# Patient Record
Sex: FEMALE | Race: WHITE | Employment: FULL TIME | URBAN - METROPOLITAN AREA
[De-identification: names, ages, dates, MRNs, and addresses within clinical notes are randomized per-mention and may not be internally consistent; named-entity substitution may affect disease eponyms.]

---

## 2018-09-14 ENCOUNTER — OFFICE VISIT (OUTPATIENT)
Dept: DERMATOLOGY | Facility: CLINIC | Age: 38
End: 2018-09-14
Payer: COMMERCIAL

## 2018-09-14 VITALS — SYSTOLIC BLOOD PRESSURE: 119 MMHG | HEART RATE: 87 BPM | DIASTOLIC BLOOD PRESSURE: 75 MMHG

## 2018-09-14 DIAGNOSIS — L82.1 SEBORRHEIC KERATOSIS: Primary | ICD-10-CM

## 2018-09-14 DIAGNOSIS — L60.3 ONYCHORRHEXIS: ICD-10-CM

## 2018-09-14 DIAGNOSIS — D22.5 MELANOCYTIC NEVUS OF TRUNK: ICD-10-CM

## 2018-09-14 DIAGNOSIS — L81.4 SOLAR LENTIGO: ICD-10-CM

## 2018-09-14 ASSESSMENT — PAIN SCALES - GENERAL: PAINLEVEL: NO PAIN (0)

## 2018-09-14 NOTE — PATIENT INSTRUCTIONS
Dr. Gilda Hernandez - cosmetic dermatologist   - discuss treatment of solar lentigines on the face   - discuss removal of benign-appearing moles on the back   - discuss removal of non-inflamed seborrheic keratoses on the face    Hair/skin/nails multivitamin for nail changes  Can use over the counter nail lacquer to strengthen nails

## 2018-09-14 NOTE — LETTER
9/14/2018       RE: Marium Moss  [address] [address]     Dear Colleague,    Thank you for referring your patient, Marium Moss, to the [facility] DERMATOLOGY at [facility]. Please see a copy of my visit note below.    [facility] Dermatology Note      Dermatology Problem List:  1. Solar lentigines, seborrheic keratoses on face  2. Banal-appearing melanocytic nevi on the trunk    CC:   Chief Complaint   Patient presents with     Derm Problem     Marium is here today for a total body skin exam, patient has concern for spots on her face and brittle nails.          Encounter Date: Sep 14, 2018    History of Present Illness:  Ms. Marium Moss is a 38 year old female who presents for evaluation of several skin lesions of concern.     Two moles on back   - longstanding   - one raised - sometimes itchy, bleeds with trauma   - not otherwise symptomatic    Face - several dark brown spots on the face   - upper lip, sides of face, cheeks   - getting bigger and darker   - previously evaluated and diagnosed as solar lentigines   - mother and grandmother have similar-appearing spots    Nails - brittle, splitting, no pain or other symptoms   - gradual onset over last several years      Past Medical History:   Patient Active Problem List   Diagnosis     CARDIOVASCULAR SCREENING; LDL GOAL LESS THAN 160     Bipolar disorder (manic depression) (H)     Past Medical History:   Diagnosis Date     Bipolar affective disorder (H)      Depressive disorder, not elsewhere classified     sees therapist - Mary Ceja for med mgmt     Skin disease      Past Surgical History:   Procedure Laterality Date     NO HISTORY OF SURGERY         Social History:   reports that she has never smoked. She has never used smokeless tobacco. She reports that she drinks alcohol. She reports that she uses illicit drugs, including Marijuana.   Alcohol - 1-2 drinks per week    Family  History:  Family History   Problem Relation Age of Onset     Diabetes Maternal Grandmother      Hypertension Maternal Grandmother      Cerebrovascular Disease Maternal Grandmother      Thyroid Disease Maternal Grandmother      Skin Cancer Maternal Grandmother      Depression Mother      Lipids Mother      Skin Cancer Mother      Depression       self     GASTROINTESTINAL DISEASE Father      ulcer     GASTROINTESTINAL DISEASE       self   Mother did not have skin cancer - had actinic keratosis  Grandmother had NMSC    Medications:  Current Outpatient Prescriptions   Medication Sig Dispense Refill     CLONAZEPAM PO        drospirenone-ethinyl estradiol (ALBERTO) 3-0.03 MG per tablet Take 1 tablet by mouth At Bedtime 30 tablet      Allergies   Allergen Reactions     Penicillin [Penicillins] Rash         Review of Systems:  -Skin Establ Pt: The patient denies any new rash, pruritus, or lesions that are symptomatic, changing or bleeding, except as per HPI.  -Constitutional: The patient denies fatigue, fevers, chills, unintended weight loss, and night sweats.  -HEENT: Patient denies nonhealing oral sores.  -Skin: As above in HPI. No additional skin concerns.    Physical exam:  Vitals: /75 (BP Location: Left arm, Patient Position: Sitting, Cuff Size: Adult Regular)  Pulse 87  GEN: This is a well developed, well-nourished female in no acute distress, in a pleasant mood.    SKIN: Total skin excluding the undergarment areas was performed. The exam included the head/face, neck, both arms, chest, back, abdomen, both legs, digits and/or nails.   -Several thin stuck-on appearing brown papules on the central face  -Numerous tan evenly pigmented macules on the face, trunk, and extremities  -Two brown evenly pigmented papules on the central back  -Onychorrhexis of the thumbs > fifth fingernails. No subungual hyperkeratosis  -No other lesions of concern on areas examined.     Impression/Plan:  1. Facial seborrheic keratoses,  solar lentigines, and ephelides    Reassurance provided for these benign, asymptomatic lesions not treated today. If cosmetic treatment is desired, recommend follow up with one of our cosmetic dermatologists.    2. Onychorrhexis    Discussed that this is a normal, age-related finding that does not require further treatment. It can be associated with frequent soap/water exposure as well. There are certain types of rashes that can cause onychorrhexis which are absent in this patient. Can trial hair/skin/nails vitamin supplementation if desired, but discussed that there is not significant evidence to support use.    CC Dr. Gilda Hernandez on close of this encounter.  Follow-up prn       Staff Involved:  Staff Only    Edvin Garcia MD   of Dermatology  Department of Dermatology  [facility]

## 2018-09-14 NOTE — MR AVS SNAPSHOT
After Visit Summary   2018    Marium Moss    MRN: [id number]           Patient Information     Date Of Birth          1980        Visit Information        Provider Department      2018 12:15 PM Edvin Garcia MD [facility] D[facility]at[facility]        Today's Diagnoses     Seborrheic keratosis    -  1    Solar lentigo        Melanocytic nevus of trunk        Onychorrhexis          Care Instructions    Dr. Gilda Hernandez - cosmetic dermatologist   - discuss treatment of solar lentigines on the face   - discuss removal of benign-appearing moles on the back   - discuss removal of non-inflamed seborrheic keratoses on the face    Hair/skin/nails multivitamin for nail changes  Can use over the counter nail lacquer to strengthen nails          Follow-ups after your visit        Your next 10 appointments already scheduled     Oct 24, 2018  9:30 AM CDT   (Arrive by 9:15 AM)   Return Visit with MD MARY Charles [facility] Dermatology ([facility])    [address] [place] [address] [phone]              Who to contact     Please call your clinic at [phone] to:    Ask questions about your health    Make or cancel appointments    Discuss your medicines    Learn about your test results    Speak to your doctor            Additional Information About Your Visit        MyChart Information     [employer] is an electronic gateway that provides easy, online access to your medical records. With [employer], you can request a clinic appointment, read your test results, renew a prescription or communicate with your care team.     To sign up for [employer] visit the website at [email or web address]   You will be asked to enter the access code listed below, as well as some personal information. Please follow the directions to create your username and password.     Your access code is: [email or web address]  Expires: 2018  6:30 AM     Your access code will  in 90  days. If you need help or a new code, please contact your [facility] or call [phone] for assistance.        Care EveryWhere ID     This is your Care EveryWhere ID. This could be used by other organizations to access your [facility] medical records  [id number]        Your Vitals Were     Pulse                   87            Blood Pressure from Last 3 Encounters:   09/14/18 119/75   09/03/13 121/76   09/02/13 114/68    Weight from Last 3 Encounters:   09/02/13 52.2 kg (115 lb)   09/02/13 52.2 kg (115 lb)   12/28/12 53.1 kg (117 lb)              Today, you had the following     No orders found for display       Primary Care Provider Office Phone # Fax #    [facility] [phone] [address] [facility] [address]        Equal Access to Services     HONORIO BYRNE : Hadii aad ku hadasho Soomaali, waaxda luqadaha, qaybta kaalmada adeegyada, waxay idiin hayaan zulema dawson . So [facility] [phone].    ATENCIÓN: Si habla español, tiene a arenas disposición servicios gratuitos de asistencia lingüística. Ulysses al [phone].    We comply with applicable federal civil rights laws and [place] laws. We do not discriminate on the basis of race, color, national origin, age, disability, sex, sexual orientation, or gender identity.            Thank you!     Thank you for choosing [facility] DERMATOLOGY  for your care. Our goal is always to provide you with excellent care. Hearing back from our patients is one way we can continue to improve our services. Please take a few minutes to complete the written survey that you may receive in the mail after your visit with us. Thank you!             Your Updated Medication List - Protect others around you: Learn how to safely use, store and throw away your medicines at www.disposemymeds.org.          This list is accurate as of 9/14/18  1:05 PM.  Always use your most recent med list.                    Brand Name Dispense Instructions for use Diagnosis    CLONAZEPAM PO           drospirenone-ethinyl estradiol 3-0.03 MG per tablet    ALBERTO    30 tablet    Take 1 tablet by mouth At Bedtime

## 2018-09-14 NOTE — NURSING NOTE
Chief Complaint   Patient presents with     Derm Problem     Marium is here today for a total body skin exam, patient has concern for spots on her face and brittle nails.      Amie Ritter LPN

## 2018-10-24 ENCOUNTER — OFFICE VISIT (OUTPATIENT)
Dept: DERMATOLOGY | Facility: CLINIC | Age: 38
End: 2018-10-24
Payer: COMMERCIAL

## 2018-10-24 DIAGNOSIS — L81.1 MELASMA: Primary | ICD-10-CM

## 2018-10-24 DIAGNOSIS — L81.4 SOLAR LENTIGINOSIS: ICD-10-CM

## 2018-10-24 RX ORDER — AZELAIC ACID 0.15 G/G
GEL TOPICAL
Qty: 50 G | Refills: 11 | Status: SHIPPED | OUTPATIENT
Start: 2018-10-24 | End: 2020-08-26

## 2018-10-24 RX ORDER — HYDROQUINONE 40 MG/G
CREAM TOPICAL
Qty: 30 G | Refills: 1 | Status: SHIPPED | OUTPATIENT
Start: 2018-10-24 | End: 2020-08-26

## 2018-10-24 ASSESSMENT — PAIN SCALES - GENERAL: PAINLEVEL: NO PAIN (0)

## 2018-10-24 NOTE — LETTER
10/24/2018       RE: Marium Moss  [address] [address]     Dear Colleague,    Thank you for referring your patient, Marium Moss, to the [facility] DERMATOLOGY at [facility]. Please see a copy of my visit note below.    [facility] Dermatology Note      Dermatology Problem List:  1. Solar lentigines, seborrheic keratoses on face  2. Banal-appearing melanocytic nevi on the trunk     Encounter Date: Oct 24, 2018    CC:   Chief Complaint   Patient presents with     Derm Problem     Marium would like to discuss the dark spots on her face.         History of Present Illness:  Ms. Marium Moss is a 38 year old female who presents as a follow-up for evaluation of dark spots on her face. The patient was last seen in 2016. At that time she was not interested in treatment regarding the dark spots on her face. However lately these spots seem to have gotten bigger and darker. Also patient started noticing new small dark spots throughout her face and upper lips. Given the worsening of her condition she is seeking treatment now.    She uses lotion with 15% SPF sunscreen. Patient is from [place] and had a lot of sun exposure growing up. Uses OCPs, no recent pregnancies.     Past Medical History:   Patient Active Problem List   Diagnosis     CARDIOVASCULAR SCREENING; LDL GOAL LESS THAN 160     Bipolar disorder (manic depression) (H)     Past Medical History:   Diagnosis Date     Bipolar affective disorder (H)      Depressive disorder, not elsewhere classified     sees therapist - Mary Ceja for med mgmt     Skin disease      Past Surgical History:   Procedure Laterality Date     NO HISTORY OF SURGERY         Social History:  Patient  reports that she has never smoked. She has never used smokeless tobacco. She reports that she drinks alcohol. She reports that she uses illicit drugs, including Marijuana.    Family History:  Family History   Problem Relation Age  of Onset     Diabetes Maternal Grandmother      Hypertension Maternal Grandmother      Cerebrovascular Disease Maternal Grandmother      Thyroid Disease Maternal Grandmother      Skin Cancer Maternal Grandmother      Depression Mother      Lipids Mother      Skin Cancer Mother      Depression       self     GASTROINTESTINAL DISEASE Father      ulcer     GASTROINTESTINAL DISEASE       self       Medications:  Current Outpatient Prescriptions   Medication Sig Dispense Refill     CLONAZEPAM PO        drospirenone-ethinyl estradiol (ALBERTO) 3-0.03 MG per tablet Take 1 tablet by mouth At Bedtime 30 tablet         Allergies   Allergen Reactions     Penicillin [Penicillins] Rash         Review of Systems:  -As per HPI  -Constitutional: The patient denies fatigue, fevers, chills, unintended weight loss, and night sweats.  -HEENT: Patient denies nonhealing oral sores.  -Skin: As above in HPI. No additional skin concerns.    Physical exam:  Vitals: There were no vitals taken for this visit.  GEN: This is a well developed, well-nourished male in no acute distress, in a pleasant mood.    SKIN: Focused examination of the face was performed.  - 2 brown regular macules each measuring 1 cm in diameter one under the right eye and one under the left eye   -No other lesions of concern on areas examined.     Impression/Plan:  1. Solar lentigines and melamsa    Hydroquinone4% cream    Azelaic acid    Chemical peels shuld be considerex if not improved.     Recommended daily use of 50% SPF sunscreen with zinc oxide    RTC in 3 months    CC Dr. Hernandez on close of this encounter.  Follow-up in 3 months, earlier for new or changing lesions.     Staff Involved:  Resident(Zoya Dick)/Staff(as above)                                          Again, thank you for allowing me to participate in the care of your patient.      Sincerely,    Gilda Hernandez MD

## 2018-10-24 NOTE — PATIENT INSTRUCTIONS
THE ORDINARY  Azelaic Acid Suspension 10%  SIZE 1 oz/ 30 mL [id number] [id number]          online only at [employer]  $7.90      Avobenzone, Octocrylene,-Key ingredients    Skinceuticals.

## 2018-10-24 NOTE — PROGRESS NOTES
[facility] Dermatology Note      Dermatology Problem List:  1. Solar lentigines, seborrheic keratoses on face  2. Banal-appearing melanocytic nevi on the trunk     Encounter Date: Oct 24, 2018    CC:   Chief Complaint   Patient presents with     Derm Problem     Marium would like to discuss the dark spots on her face.         History of Present Illness:  Ms. Marium Moss is a 38 year old female who presents as a follow-up for evaluation of dark spots on her face. The patient was last seen in 2016. At that time she was not interested in treatment regarding the dark spots on her face. However lately these spots seem to have gotten bigger and darker. Also patient started noticing new small dark spots throughout her face and upper lips. Given the worsening of her condition she is seeking treatment now.    She uses lotion with 15% SPF sunscreen. Patient is from [place] and had a lot of sun exposure growing up. Uses OCPs, no recent pregnancies.     Past Medical History:   Patient Active Problem List   Diagnosis     CARDIOVASCULAR SCREENING; LDL GOAL LESS THAN 160     Bipolar disorder (manic depression) (H)     Past Medical History:   Diagnosis Date     Bipolar affective disorder (H)      Depressive disorder, not elsewhere classified     sees therapist - Mary Ceja for med mgmt     Skin disease      Past Surgical History:   Procedure Laterality Date     NO HISTORY OF SURGERY         Social History:  Patient  reports that she has never smoked. She has never used smokeless tobacco. She reports that she drinks alcohol. She reports that she uses illicit drugs, including Marijuana.    Family History:  Family History   Problem Relation Age of Onset     Diabetes Maternal Grandmother      Hypertension Maternal Grandmother      Cerebrovascular Disease Maternal Grandmother      Thyroid Disease Maternal Grandmother      Skin Cancer Maternal Grandmother      Depression Mother      Lipids Mother      Skin Cancer  Mother      Depression       self     GASTROINTESTINAL DISEASE Father      ulcer     GASTROINTESTINAL DISEASE       self       Medications:  Current Outpatient Prescriptions   Medication Sig Dispense Refill     CLONAZEPAM PO        drospirenone-ethinyl estradiol (ALBERTO) 3-0.03 MG per tablet Take 1 tablet by mouth At Bedtime 30 tablet         Allergies   Allergen Reactions     Penicillin [Penicillins] Rash         Review of Systems:  -As per HPI  -Constitutional: The patient denies fatigue, fevers, chills, unintended weight loss, and night sweats.  -HEENT: Patient denies nonhealing oral sores.  -Skin: As above in HPI. No additional skin concerns.    Physical exam:  Vitals: There were no vitals taken for this visit.  GEN: This is a well developed, well-nourished male in no acute distress, in a pleasant mood.    SKIN: Focused examination of the face was performed.  - 2 brown regular macules each measuring 1 cm in diameter one under the right eye and one under the left eye   -ill defined pigmented patches, cheeks an forehead and perioral  -No other lesions of concern on areas examined.     Impression/Plan:  1. Solar lentigines and melasma    Hydroquinone4% cream    Azelaic acid    Chemical peels shuld be considerex if not improved.     Recommended daily use of 50% SPF sunscreen with zinc oxide    RTC in 3 months    CC Dr. Hernandez on close of this encounter.  Follow-up in 3 months, earlier for new or changing lesions.     Staff Involved:  Resident(Zoya Dick)/Staff(as above)    Staff Physician Comments:   I saw and evaluated the patient with the resident and I agree with the assessment and plan.  I was present for the examination..    Gilda Hernandez MD    Department of Dermatology  [facility]: Phone: [phone], Fax:[phone]  [facility]: Phone: [phone], Fax: [phone]

## 2018-10-24 NOTE — MR AVS SNAPSHOT
After Visit Summary   10/24/2018    Marium Moss    MRN: [id number]           Patient Information     Date Of Birth          1980        Visit Information        Provider Department      10/24/2018 9:30 AM Gilda Hernandez MD M [facility] Dermatology        Today's Diagnoses     Melasma    -  1    Solar lentiginosis          Care Instructions    THE ORDINARY  Azelaic Acid Suspension 10%  SIZE 1 oz/ 30 mL [id number] [id number]          online only at [employer]  $7.90      Avobenzone, Octocrylene,-Key ingredients    Skinceuticals.           Follow-ups after your visit        Follow-up notes from your care team     Return in about 3 months (around 1/24/2019) for for skin discoloration.      Your next 10 appointments already scheduled     Jan 23, 2019 12:30 PM CST   (Arrive by 12:15 PM)   Return Visit with MD MARY Charles [facility] Dermatology ([facility])    [address] [address] [phone]              Who to contact     Please call your clinic at [phone] to:    Ask questions about your health    Make or cancel appointments    Discuss your medicines    Learn about your test results    Speak to your doctor            Additional Information About Your Visit        Aff[employer]Click[employer] Information     [employer] gives you secure access to your electronic health record. If you see a primary care provider, you can also send messages to your care team and make appointments. If you have questions, please call your primary care clinic.  If you do not have a primary care provider, please call [phone] and they will assist you.      C[employer]oster is an electronic gateway that provides easy, online access to your medical records. With [employer], you can request a clinic appointment, read your test results, renew a prescription or communicate with your care team.     To access your existing account, please contact your [facility] or call  [phone] for assistance.        Care EveryWhere ID     This is your Care EveryWhere ID. This could be used by other organizations to access your [facility] medical records  [id number]         Blood Pressure from Last 3 Encounters:   09/14/18 119/75   09/03/13 121/76   09/02/13 114/68    Weight from Last 3 Encounters:   09/02/13 52.2 kg (115 lb)   09/02/13 52.2 kg (115 lb)   12/28/12 53.1 kg (117 lb)              Today, you had the following     No orders found for display         Today's Medication Changes          These changes are accurate as of 10/24/18 10:32 AM.  If you have any questions, ask your nurse or doctor.               Start taking these medicines.        Dose/Directions    Azelaic Acid 15 % gel   Used for:  Melasma, Solar lentiginosis        Apply once daily in the am   Quantity:  50 g   Refills:  11       hydroquinone 4 % Crea   Used for:  Melasma, Solar lentiginosis        Apply a thin layer to  the face for up to 12 weeks in a row.   Quantity:  30 g   Refills:  1            Where to get your medicines      These medications were sent to [facility] [address] [facility] [address] [facility] [address]  [address] [facility] [address]     Phone:  [phone]     Azelaic Acid 15 % gel    hydroquinone 4 % Crea                Primary Care Provider Office Phone # Fax #    [facility] [place] [facility] [phone] [address] [place] [address]        Equal Access to Services     HONORIO BYRNE AH: Hadii soham james hadasho Soaguilaali, waaxda luqadaha, qaybta kaalmada vivian, moises viveros. So [facility] [phone].    ATENCIÓN: Si habla español, tiene a arenas disposición servicios gratuitos de asistencia lingüística. Llame al [phone].    We comply with applicable federal civil rights laws and [place] laws. We do not discriminate on the basis of race, color, national origin, age, disability,  sex, sexual orientation, or gender identity.            Thank you!     Thank you for choosing [facility] DERMATOLOGY  for your care. Our goal is always to provide you with excellent care. Hearing back from our patients is one way we can continue to improve our services. Please take a few minutes to complete the written survey that you may receive in the mail after your visit with us. Thank you!             Your Updated Medication List - Protect others around you: Learn how to safely use, store and throw away your medicines at www.disposemymeds.org.          This list is accurate as of 10/24/18 10:32 AM.  Always use your most recent med list.                   Brand Name Dispense Instructions for use Diagnosis    Azelaic Acid 15 % gel     50 g    Apply once daily in the am    Melasma, Solar lentiginosis       CLONAZEPAM PO           drospirenone-ethinyl estradiol 3-0.03 MG per tablet    ALBERTO    30 tablet    Take 1 tablet by mouth At Bedtime        hydroquinone 4 % Crea     30 g    Apply a thin layer to  the face for up to 12 weeks in a row.    Melasma, Solar lentiginosis

## 2018-10-24 NOTE — NURSING NOTE
Dermatology Rooming Note    Marium Moss's goals for this visit include:   Chief Complaint   Patient presents with     Derm Problem     Marium would like to discuss the dark spots on her face.     Noelle Polk LPN

## 2018-10-29 ENCOUNTER — HEALTH MAINTENANCE LETTER (OUTPATIENT)
Age: 38
End: 2018-10-29

## 2018-10-31 ENCOUNTER — TELEPHONE (OUTPATIENT)
Dept: DERMATOLOGY | Facility: CLINIC | Age: 38
End: 2018-10-31

## 2018-10-31 NOTE — TELEPHONE ENCOUNTER
Prior Authorization Retail Medication Request    Medication/Dose: HYDROQUINONE 4%  Rationale:  Solar lentigines and melamsa    Insurance Name:  [facility]  Insurance ID:  [id number]       Pharmacy Information (if different than what is on RX)  Name:  [facility]  Phone:  [phone]

## 2019-04-17 ENCOUNTER — COMMUNICATION - HEALTHEAST (OUTPATIENT)
Dept: SCHEDULING | Facility: CLINIC | Age: 39
End: 2019-04-17

## 2019-11-08 ENCOUNTER — HEALTH MAINTENANCE LETTER (OUTPATIENT)
Age: 39
End: 2019-11-08

## 2020-02-23 ENCOUNTER — HEALTH MAINTENANCE LETTER (OUTPATIENT)
Age: 40
End: 2020-02-23

## 2020-08-26 ENCOUNTER — VIRTUAL VISIT (OUTPATIENT)
Dept: DERMATOLOGY | Facility: CLINIC | Age: 40
End: 2020-08-26
Payer: COMMERCIAL

## 2020-08-26 DIAGNOSIS — L81.1 MELASMA: ICD-10-CM

## 2020-08-26 DIAGNOSIS — L81.4 SOLAR LENTIGINOSIS: ICD-10-CM

## 2020-08-26 RX ORDER — HYDROQUINONE 40 MG/G
CREAM TOPICAL
Qty: 30 G | Refills: 1 | Status: SHIPPED | OUTPATIENT
Start: 2020-08-26 | End: 2021-03-26

## 2020-08-26 NOTE — LETTER
8/26/2020       RE: Marium Moss  [address] [address]     Dear Colleague,    Thank you for referring your patient, Marium Moss, to the [facility] DERMATOLOGY at [facility]. Please see a copy of my visit note below.    [facility] Dermatology Record:  Store and Forward and Telephone [phone]       Dermatology Problem List:  1. Solar lentigines, seborrheic keratoses on face  2. Banal-appearing melanocytic nevi on the trunk  3. Melasma  -hydroquinone4% cream  -Azelaic acid  -Chemical peels shuld be considerex if not improved.   Encounter Date: Aug 26, 2020    CC:   Chief Complaint   Patient presents with     Derm Problem     Melasma follow up       History of Present Illness:  Marium Moss is a 40 year old female who presents for follow up of melasma. Has not used hydroquinone since last visit. Did not competed courtse, wants to try again. On OCP but will be stopping in 1 month. Wants her products checked. DOes wear make up      Not preg or BF    Physical Examination:  General: Well-appearing   Skin: Focused examination including face was performed.   -There are scattered light brown macules on sun exposed areas of cheeks and lower face, darker larger lesion right cheek    Labs:  NA    Past Medical History:   Patient Active Problem List   Diagnosis     CARDIOVASCULAR SCREENING; LDL GOAL LESS THAN 160     Bipolar disorder (manic depression) (H)     Past Medical History:   Diagnosis Date     Bipolar affective disorder (H)      Depressive disorder, not elsewhere classified     sees therapist - Mary Ceja for med mgmt     Skin disease      Past Surgical History:   Procedure Laterality Date     NO HISTORY OF SURGERY         Social History:  Patient reports that she has never smoked. She has never used smokeless tobacco. She reports current alcohol use. She reports current drug use. Drug: Marijuana.    Family History:  Family History   Problem Relation Age of Onset      Diabetes Maternal Grandmother      Hypertension Maternal Grandmother      Cerebrovascular Disease Maternal Grandmother      Thyroid Disease Maternal Grandmother      Skin Cancer Maternal Grandmother      Depression Mother      Lipids Mother      Skin Cancer Mother      Depression Unknown         self     Gastrointestinal Disease Father         ulcer     Gastrointestinal Disease Unknown         self       Medications:  Current Outpatient Medications   Medication     CLONAZEPAM PO     No current facility-administered medications for this visit.       northindrone .35mg daily     Allergies   Allergen Reactions     Penicillin [Penicillins] Rash           Impression and Recommendations (Patient Counseled on the Following):  1. Solar lentigines and melasma, not pregnant or breast feeding- on CE ferrulic, non physical sunscreen and kojic acid    Hydroquinone4% cream for 3 months to entire face, not eyelids, reviewed FDA warnings. Also reviewed risk of irritation    Azelaic acid hold foir now, pt reports she does get skin irritation    Chemical peels shuld be considerex if not improved. reviewed    Recommended daily use of 50% SPF sunscreen with zinc oxide/titanium dioxide and iron oxide- pt concerned for prior irritation with skinceuticals fusion so recommended trial of revision intellishade on wrist for 1 weeks, if normal, try on face, declines allergy testing    She is stopping OCP with OB in about  A month after tubal ligation    RTC in 3 months  2. Lesion, right cheek, possibly SK, very dark, recommend in person assessment randall 12 weeks.     Follow-up:   Within 3 months cheeks spot check     Staff only    Gilda Hernandez MD    Department of Dermatology  [facility]: Phone: [phone], Fax:[phone]  [facility]: Phone: [phone], Fax:  [phone]      _____________________________________________________________________________    Teledermatology information:  - Location of patient: [place]  - Patient presented as: return  - Location of teledermatologist:  [facility] DERMATOLOGY )  - Reason teledermatology is appropriate:  of National Emergency Regarding Coronavirus disease (COVID 19) Outbreak  - Image quality and interpretability: acceptable  - Physician has received verbal consent for a Video/Photos Visit from the patient? Yes  - In-person dermatology visit recommendation: yes - for physician visit  - Date of images: 8/24/20210  - Service start time:10:24am  - Service end time:10:39am  - Date of report: 8/26/2020

## 2020-08-26 NOTE — PATIENT INSTRUCTIONS
[facility] Dermatology Visit    Thank you for allowing us to participate in your care. Your findings, instructions and follow-up plan are as follows:    For REVISION cosmetic skin care products(intellishade):    1. Visit: [email or web address]    2. Or visit the [email or web address] and select the [facility]/ Gilda Hernandez to check out        By using these links a portion of your purchase will be credited to the [facility]  Department of Dermatology.       For Skinceuticals Products Shipped to your Home From [facility]:   1. Call [phone] and ask to have product shipped to you.         When should I call my doctor?    If you are worsening or not improving, please, contact us or seek urgent care as noted below.     Who should I call with questions (adults)?    [facility] (adult and pediatric): [phone]     [facility] (adult): [phone]    For urgent needs outside of business hours call the [facility] at [phone] and ask for the dermatology resident on call    If this is a medical emergency and you are unable to reach an ER, Call [phone]      Who should I call with questions (pediatric)?  [facility]- Pediatric Dermatology  Dr. Nel Conte, Dr. Karmen Albarran, Dr. Karen Rosas, Isabelle Alcaraz, PA  Dr. Mely Nathan, Dr. Nella Lamb & Dr. Edvin Vang  Non Urgent  Nurse Triage Line; [phone]- Natalie and Nyla TELLO Care Coordinators   Marianne (/Complex ) [phone]    If you need a prescription refill, please contact your pharmacy. Refills are approved or denied by our Physicians during normal business hours, Monday through Fridays  Per office policy, refills will not be granted if you have not been seen within the past year (or sooner depending on your child's  condition)    Scheduling Information:  Pediatric Appointment Scheduling and Call Center [phone]  Radiology Scheduling- [phone]  Sedation Unit Scheduling- [phone]  [place] Scheduling- General [phone]; Pediatric Dermatology [phone]  Main  Services: [phone]  Citizen of the [place]: [phone]  Ugandan: [phone]  Hmong/Serbian/Japanese: [phone]  Preadmission Nursing Department Fax Number: [phone] (Fax all pre-operative paperwork to this number)    For urgent matters arising during evenings, weekends, or holidays that cannot wait for normal business hours please call [phone] and ask for the Dermatology Resident On-Call to be paged.

## 2020-08-26 NOTE — PROGRESS NOTES
[facility] Dermatology Record:  Store and Forward and Telephone [phone]       Dermatology Problem List:  1. Solar lentigines, seborrheic keratoses on face  2. Banal-appearing melanocytic nevi on the trunk  3. Melasma  -hydroquinone4% cream  -Azelaic acid  -Chemical peels shuld be considerex if not improved.   Encounter Date: Aug 26, 2020    CC:   Chief Complaint   Patient presents with     Derm Problem     Melasma follow up       History of Present Illness:  Marium Moss is a 40 year old female who presents for follow up of melasma. Has not used hydroquinone since last visit. Did not competed courtse, wants to try again. On OCP but will be stopping in 1 month. Wants her products checked. DOes wear make up      Not preg or BF    Physical Examination:  General: Well-appearing   Skin: Focused examination including face was performed.   -There are scattered light brown macules on sun exposed areas of cheeks and lower face, darker larger lesion right cheek    Labs:  NA    Past Medical History:   Patient Active Problem List   Diagnosis     CARDIOVASCULAR SCREENING; LDL GOAL LESS THAN 160     Bipolar disorder (manic depression) (H)     Past Medical History:   Diagnosis Date     Bipolar affective disorder (H)      Depressive disorder, not elsewhere classified     sees therapist - Mary Ceja for med mgmt     Skin disease      Past Surgical History:   Procedure Laterality Date     NO HISTORY OF SURGERY         Social History:  Patient reports that she has never smoked. She has never used smokeless tobacco. She reports current alcohol use. She reports current drug use. Drug: Marijuana.    Family History:  Family History   Problem Relation Age of Onset     Diabetes Maternal Grandmother      Hypertension Maternal Grandmother      Cerebrovascular Disease Maternal Grandmother      Thyroid Disease Maternal Grandmother      Skin Cancer Maternal Grandmother      Depression Mother      Lipids Mother      Skin Cancer Mother       Depression Unknown         self     Gastrointestinal Disease Father         ulcer     Gastrointestinal Disease Unknown         self       Medications:  Current Outpatient Medications   Medication     CLONAZEPAM PO     No current facility-administered medications for this visit.       northindrone .35mg daily     Allergies   Allergen Reactions     Penicillin [Penicillins] Rash           Impression and Recommendations (Patient Counseled on the Following):  1. Solar lentigines and melasma, not pregnant or breast feeding- on CE ferrulic, non physical sunscreen and kojic acid    Hydroquinone4% cream for 3 months to entire face, not eyelids, reviewed FDA warnings. Also reviewed risk of irritation    Azelaic acid hold foir now, pt reports she does get skin irritation    Chemical peels shuld be considerex if not improved. reviewed    Recommended daily use of 50% SPF sunscreen with zinc oxide/titanium dioxide and iron oxide- pt concerned for prior irritation with skinceuticals fusion so recommended trial of revision intellishade on wrist for 1 weeks, if normal, try on face, declines allergy testing    She is stopping OCP with OB in about  A month after tubal ligation    RTC in 3 months  2. Lesion, right cheek, possibly SK, very dark, recommend in person assessment randall 12 weeks.     Follow-up:   Within 3 months cheeks spot check     Staff only    Gilda Hernandez MD    Department of Dermatology  [facility]: Phone: [phone], Fax:[phone]  [facility]: Phone: [phone], Fax: [phone]      _____________________________________________________________________________    Teledermatology information:  - Location of patient: [place]  - Patient presented as: return  - Location of teledermatologist:  ([facility] DERMATOLOGY )  - Reason teledermatology is appropriate:  of National Emergency  Regarding Coronavirus disease (COVID 19) Outbreak  - Image quality and interpretability: acceptable  - Physician has received verbal consent for a Video/Photos Visit from the patient? Yes  - In-person dermatology visit recommendation: yes - for physician visit  - Date of images: 8/24/20210  - Service start time:10:24am  - Service end time:10:39am  - Date of report: 8/26/2020

## 2020-12-06 ENCOUNTER — HEALTH MAINTENANCE LETTER (OUTPATIENT)
Age: 40
End: 2020-12-06

## 2021-03-11 ENCOUNTER — MYC MEDICAL ADVICE (OUTPATIENT)
Dept: DERMATOLOGY | Facility: CLINIC | Age: 41
End: 2021-03-11

## 2021-03-12 NOTE — TELEPHONE ENCOUNTER
Called patient and scheduled. Patient aware she is being squeezed in on 3/26/21 and may have a wait.    Sara Brand LPN

## 2021-03-26 ENCOUNTER — OFFICE VISIT (OUTPATIENT)
Dept: DERMATOLOGY | Facility: CLINIC | Age: 41
End: 2021-03-26
Payer: COMMERCIAL

## 2021-03-26 DIAGNOSIS — D48.5 NEOPLASM OF UNCERTAIN BEHAVIOR OF SKIN: Primary | ICD-10-CM

## 2021-03-26 PROCEDURE — 11104 PUNCH BX SKIN SINGLE LESION: CPT | Performed by: DERMATOLOGY

## 2021-03-26 PROCEDURE — 88305 TISSUE EXAM BY PATHOLOGIST: CPT | Performed by: DERMATOLOGY

## 2021-03-26 ASSESSMENT — PAIN SCALES - GENERAL: PAINLEVEL: NO PAIN (0)

## 2021-03-26 NOTE — LETTER
3/26/2021         RE: Marium Moss  [address] [address]        Dear Colleague,    Thank you for referring your patient, Marium Moss, to the [facility]. Please see a copy of my visit note below.    [facility] Dermatology Note  Encounter Date: Mar 26, 2021  Office Visit     Dermatology Problem List:  1. Solar lentigines, seborrheic keratoses on face  2. Banal-appearing melanocytic nevi on the trunk  3. Melasma  -hydroquinone4% cream  -Azelaic acid  -Chemical peels shuld be considerex if not improved.     ____________________________________________    Assessment & Plan:    # Solar lentigines and melasma, not pregnant or breast feeding- on CE ferrulic, non physical sunscreen and kojic acid - patient discontinued Hydroquinone due to irritation   - holding at this time    # Neoplasm of uncertain behavior on the right cheek. The differential diagnosis includes SK vs melanoma versus other. Patient delay in follow up as per last record. Also, reports rapid change the last 2 weeks.     - See procedure note.     Procedures Performed:   -Punch biopsy:  After discussion of benefits and risks including but not limited to bleeding/bruising, pain/swelling, infection, scar, incomplete removal, nerve damage/numbness, recurrence, and non-diagnostic biopsy, written consent, verbal consent and photographs were obtained. Time-out was performed. The area was cleaned with isopropyl alcohol. 0.5mL of 1% lidocaine with epinephrine was injected to obtain adequate anesthesia of the lesion. A 2 mm punch biopsy was performed.  5-0 prolene sutures were utilized to approximate the epidermal edges.  White petroleum jelly/VaselineTM and a bandage was applied to the wound.  Explicit verbal and written wound care instructions were provided.  The patient left the Dermatology Clinic in good condition. The patient was counseled to follow up for suture removal in approximately 5-7  "days.      Follow-up: pending path results    Staff and Scribe:     Scribe Disclosure:   I, Rony Campos, am serving as a scribe to document services personally performed by this physician, Dr. Gilda Hernandez, based on data collection and the provider's statements to me.     Provider Disclosure:   The documentation recorded by the scribe accurately reflects the services I personally performed and the decisions made by me.    Gilda Hernandez MD    Department of Dermatology  [facility]: Phone: [phone], Fax:[phone]  [facility]: Phone: [phone], Fax: [phone]      ____________________________________________    CC: Derm Problem (Marium here today for spot check on her right cheek, states \"red, itching, scabbing, and it grew\")    HPI:  Ms. Marium Moss is a(n) 40 year old female who presents today as a return patient for a spot check.    Last seen 08/26/2020 in a virtual visit. At that time, a lesion on the right cheek was noted. Plan was to have in person assessment within 12 weeks. Patient sent a [employer] message on 03/11/2021 stating the lesion had grown and become tender.    Today, she presents for a spot check on the right cheek. She notes this is a red, itchy, scabbing lesion that has grown. This lesion developed a scab in the last few weeks.    She is no longer using the creams for her melasma. Patient s not pregnant or breast feeding.       Patient is otherwise feeling well, without additional skin concerns.    Labs Reviewed:  N/A    Physical Exam:  Vitals: There were no vitals taken for this visit.  SKIN: Focused examination of right cheek was performed.  - Right Cheek: 1.2cm path right cheek with stuck on papules X2   - No other lesions of concern on areas examined.     Medications:  Current Outpatient Medications   Medication     CLONAZEPAM PO     hydroquinone (JUNE) 4 % " external cream     No current facility-administered medications for this visit.       Past Medical History:   Patient Active Problem List   Diagnosis     CARDIOVASCULAR SCREENING; LDL GOAL LESS THAN 160     Bipolar disorder (manic depression) (H)     Past Medical History:   Diagnosis Date     Bipolar affective disorder (H)      Depressive disorder, not elsewhere classified     sees therapist - Mary Ceja for med mgmt     Skin disease         CC No referring provider defined for this encounter. on close of this encounter.      Again, thank you for allowing me to participate in the care of your patient.        Sincerely,        Gilda Hernandez MD

## 2021-03-26 NOTE — PROGRESS NOTES
[facility] Dermatology Note  Encounter Date: Mar 26, 2021  Office Visit     Dermatology Problem List:  1. Solar lentigines, seborrheic keratoses on face  2. Banal-appearing melanocytic nevi on the trunk  3. Melasma  -hydroquinone4% cream  -Azelaic acid  -Chemical peels shuld be considerex if not improved.     ____________________________________________    Assessment & Plan:    # Solar lentigines and melasma, not pregnant or breast feeding- on CE ferrulic, non physical sunscreen and kojic acid - patient discontinued Hydroquinone due to irritation   - holding at this time    # Neoplasm of uncertain behavior on the right cheek. The differential diagnosis includes SK vs melanoma versus other. Patient delay in follow up as per last record. Also, reports rapid change the last 2 weeks.     - See procedure note.     Procedures Performed:   -Punch biopsy:  After discussion of benefits and risks including but not limited to bleeding/bruising, pain/swelling, infection, scar, incomplete removal, nerve damage/numbness, recurrence, and non-diagnostic biopsy, written consent, verbal consent and photographs were obtained. Time-out was performed. The area was cleaned with isopropyl alcohol. 0.5mL of 1% lidocaine with epinephrine was injected to obtain adequate anesthesia of the lesion. A 2 mm punch biopsy was performed.  5-0 prolene sutures were utilized to approximate the epidermal edges.  White petroleum jelly/VaselineTM and a bandage was applied to the wound.  Explicit verbal and written wound care instructions were provided.  The patient left the Dermatology Clinic in good condition. The patient was counseled to follow up for suture removal in approximately 5-7 days.      Follow-up: pending path results    Staff and Scribe:     Scribe Disclosure:   IRony, am serving as a scribe to document services personally performed by this physician, Dr. Gilda Hernandez, based on data collection and the  "provider's statements to me.     Provider Disclosure:   The documentation recorded by the scribe accurately reflects the services I personally performed and the decisions made by me.    Gilda Hernandez MD    Department of Dermatology  [facility]: Phone: [phone], Fax:[phone]  [facility]: Phone: [phone], Fax: [phone]      ____________________________________________    CC: Derm Problem (Marium here today for spot check on her right cheek, states \"red, itching, scabbing, and it grew\")    HPI:  Ms. Marium Moss is a(n) 40 year old female who presents today as a return patient for a spot check.    Last seen 08/26/2020 in a virtual visit. At that time, a lesion on the right cheek was noted. Plan was to have in person assessment within 12 weeks. Patient sent a [employer] message on 03/11/2021 stating the lesion had grown and become tender.    Today, she presents for a spot check on the right cheek. She notes this is a red, itchy, scabbing lesion that has grown. This lesion developed a scab in the last few weeks.    She is no longer using the creams for her melasma. Patient s not pregnant or breast feeding.       Patient is otherwise feeling well, without additional skin concerns.    Labs Reviewed:  N/A    Physical Exam:  Vitals: There were no vitals taken for this visit.  SKIN: Focused examination of right cheek was performed.  - Right Cheek: 1.2cm path right cheek with stuck on papules X2   - No other lesions of concern on areas examined.     Medications:  Current Outpatient Medications   Medication     CLONAZEPAM PO     hydroquinone (JUNE) 4 % external cream     No current facility-administered medications for this visit.       Past Medical History:   Patient Active Problem List   Diagnosis     CARDIOVASCULAR SCREENING; LDL GOAL LESS THAN 160     Bipolar disorder (manic depression) " (H)     Past Medical History:   Diagnosis Date     Bipolar affective disorder (H)      Depressive disorder, not elsewhere classified     sees therapist - Mary Ceja for med mgmt     Skin disease         CC No referring provider defined for this encounter. on close of this encounter.

## 2021-03-26 NOTE — PATIENT INSTRUCTIONS

## 2021-03-26 NOTE — NURSING NOTE
"Marium Moss's goals for this visit include:   Chief Complaint   Patient presents with     Derm Problem     Marium here today for spot check on her right cheek, states \"red, itching, scabbing, and it grew\"       She requests these members of her care team be copied on today's visit information:     PCP: Clinic, [facility] [place]    Referring Provider:  No referring provider defined for this encounter.    There were no vitals taken for this visit.    Do you need any medication refills at today's visit? No    Amie Ritter LPN      "

## 2021-03-29 LAB — COPATH REPORT: NORMAL

## 2021-03-31 ENCOUNTER — TELEPHONE (OUTPATIENT)
Dept: DERMATOLOGY | Facility: CLINIC | Age: 41
End: 2021-03-31

## 2021-03-31 NOTE — TELEPHONE ENCOUNTER
Result Notes     Amie Ritter LPN   3/31/2021  8:56 AM CDT      I spoke to Marium Moss and gave results. Patient understood and had no further questions or concerns.     FRITZ Max Ronda, MD   3/30/2021  5:31 PM CDT      No skin cancer, route to schedulers to put up telemedicine for melasma visit     Amie Ritter LPN

## 2021-04-02 ENCOUNTER — OFFICE VISIT (OUTPATIENT)
Dept: DERMATOLOGY | Facility: CLINIC | Age: 41
End: 2021-04-02
Payer: COMMERCIAL

## 2021-04-02 DIAGNOSIS — L81.1 MELASMA: Primary | ICD-10-CM

## 2021-04-02 DIAGNOSIS — L98.9 SKIN LESION: ICD-10-CM

## 2021-04-02 DIAGNOSIS — R23.8 SKIN IRRITATION: ICD-10-CM

## 2021-04-02 PROCEDURE — 99213 OFFICE O/P EST LOW 20 MIN: CPT | Performed by: DERMATOLOGY

## 2021-04-02 PROCEDURE — 88342 IMHCHEM/IMCYTCHM 1ST ANTB: CPT | Performed by: PATHOLOGY

## 2021-04-02 PROCEDURE — 88305 TISSUE EXAM BY PATHOLOGIST: CPT | Performed by: PATHOLOGY

## 2021-04-02 RX ORDER — HYDROQUINONE 40 MG/G
CREAM TOPICAL
Qty: 30 G | Refills: 3 | Status: SHIPPED | OUTPATIENT
Start: 2021-04-02 | End: 2021-06-18

## 2021-04-02 RX ORDER — TACROLIMUS 1 MG/G
OINTMENT TOPICAL
Qty: 30 G | Refills: 0 | Status: SHIPPED | OUTPATIENT
Start: 2021-04-02 | End: 2021-10-01

## 2021-04-02 ASSESSMENT — PAIN SCALES - GENERAL: PAINLEVEL: NO PAIN (0)

## 2021-04-02 NOTE — PATIENT INSTRUCTIONS
[facility] Dermatology Visit    Thank you for allowing us to participate in your care. Your findings, instructions and follow-up plan are as follows:         When should I call my doctor?    If you are worsening or not improving, please, contact us or seek urgent care as noted below.     Who should I call with questions (adults)?    [facility] (adult and pediatric): [phone]     [facility] (adult): [phone]    For urgent needs outside of business hours call the [facility] at [phone] and ask for the dermatology resident on call    If this is a medical emergency and you are unable to reach an ER, Call 911      Who should I call with questions (pediatric)?  [facility]- Pediatric Dermatology  Dr. Nel Conte, Dr. Karmen Albarran, Dr. Karen Rosas, Isabelle Alcaraz, PA  Dr. Mely Nathan, Dr. Nella Lamb & Dr. Edvin Vang  Non Urgent  Nurse Triage Line; [phone]- Natalie and Nyla RN Care Coordinators   Marianne (/Complex ) [phone]    If you need a prescription refill, please contact your pharmacy. Refills are approved or denied by our Physicians during normal business hours, Monday through Fridays  Per office policy, refills will not be granted if you have not been seen within the past year (or sooner depending on your child's condition)    Scheduling Information:  Pediatric Appointment Scheduling and Call Center [phone]  Radiology Scheduling- [phone]  Sedation Unit Scheduling- [phone]  [place] Scheduling- General [phone]; Pediatric Dermatology [phone]  Main  Services: [phone]  Georgian: [phone]  Mozambican: [phone]  Hmong/Azeri/Nepali: [phone]  Preadmission Nursing Department Fax Number: [phone] (Fax all pre-operative paperwork to this number)    For urgent matters arising during evenings,  weekends, or holidays that cannot wait for normal business hours please call [phone] and ask for the Dermatology Resident On-Call to be paged.

## 2021-04-02 NOTE — NURSING NOTE
Marium Moss's goals for this visit include:   Chief Complaint   Patient presents with     Suture Removal     punch bx 3/26/ r cheek     Derm Problem     fu melasma       She requests these members of her care team be copied on today's visit information:     PCP: Clinic, [facility] [place]    Referring Provider:  Referred Self, MD  No address on file    There were no vitals taken for this visit.    Do you need any medication refills at today's visit? No  Zahra Hebert, PAULIE on 4/2/2021 at 3:07 PM

## 2021-04-02 NOTE — LETTER
4/2/2021         RE: Marium Moss  [address] [facility] [address]        Dear Colleague,    Thank you for referring your patient, Marium Moss, to the [facility]. Please see a copy of my visit note below.    [facility] Dermatology Note  Encounter Date: Apr 2, 2021  Office Visit     Dermatology Problem List:  1. Solar lentigines, seborrheic keratoses on face  2. Banal-appearing melanocytic nevi on the trunk  3. Melasma  -hydroquinone4% cream  -Azelaic acid  -Chemical peels shuld be considerex if not improved.     ____________________________________________    Assessment & Plan:    # Solar lentigines and melasma, not pregnant or breast feeding- on CE ferrulic andnon physical sunscreen and kojic acid - patient discontinued Hydroquinone due to irritation.Patient is not on birth control.   - Recommend applying Hydroquinone MWF for 3 months   - Start kojic acid    # Consistent with lentigo with prominent pigment incontinence. Scale fell off with gentle pressure from R cheek. Will send to pathology. Suture removed per Dr. Hernandez. Very irritated today, hold hydroquinone here.    - Apply Vaseline x 1 week   - In one week, start Protopic BID, reviewed black botox warning      Procedures Performed:   -Scale was removed with gentle pressure from the R cheek.    Follow-up: 6 week(s) in-person, or earlier for new or changing lesions    Staff and Scribe:     Scribe Disclosure:   I, Rony Campos, am serving as a scribe to document services personally performed by this physician, Dr. Gilda Hernandez, based on data collection and the provider's statements to me.     Provider Disclosure:   The documentation recorded by the scribe accurately reflects the services I personally performed and the decisions made by me.    Gilda Hernandez MD    Department of Dermatology  [facility]: Phone: [phone],  Fax:[phone]  [facility]: Phone: [phone], Fax: [phone]      ____________________________________________    CC: Suture Removal (punch bx 3/26/ r cheek) and Derm Problem (fu melesma)    HPI:  Ms. Marium Moss is a(n) 40 year old female who presents today as a return patient for melasma.    Patient last seen on 03/26/21 for melasma. Patient was instructed to hold hydroquinone at that time, due to irritation. Biopsy was taken on the R cheek, came back as a lentigo    Today, she does still have hydroquinone cream available to use. She has been holding    Patient is otherwise feeling well, without additional skin concerns.    Labs Reviewed:  Skin, right cheek, punch:   - Consistent with lentigo with prominent pigment incontinence - (see   description)     Physical Exam:  Vitals: There were no vitals taken for this visit.  SKIN: Focused examination of the face was performed.  - Suture Right cheek,  Some scale was peeled off with gentle pressure   - No other lesions of concern on areas examined.     Medications:  Current Outpatient Medications   Medication     CLONAZEPAM PO     No current facility-administered medications for this visit.       Past Medical History:   Patient Active Problem List   Diagnosis     CARDIOVASCULAR SCREENING; LDL GOAL LESS THAN 160     Bipolar disorder (manic depression) (H)     Past Medical History:   Diagnosis Date     Bipolar affective disorder (H)      Depressive disorder, not elsewhere classified     sees therapist - Mary Ceja for med mgmt     Skin disease         CC Referred Self, MD  No address on file on close of this encounter.      Again, thank you for allowing me to participate in the care of your patient.        Sincerely,        Gilda Hernandez MD

## 2021-04-02 NOTE — PROGRESS NOTES
[facility] Dermatology Note  Encounter Date: Apr 2, 2021  Office Visit     Dermatology Problem List:  1. Solar lentigines, seborrheic keratoses on face  2. Banal-appearing melanocytic nevi on the trunk  3. Melasma  -hydroquinone4% cream  -Azelaic acid  -Chemical peels shuld be considerex if not improved.     ____________________________________________    Assessment & Plan:    # Solar lentigines and melasma, not pregnant or breast feeding- on CE ferrulic andnon physical sunscreen and kojic acid - patient discontinued Hydroquinone due to irritation.Patient is not on birth control.   - Recommend applying Hydroquinone MWF for 3 months   - Start kojic acid    # Consistent with lentigo with prominent pigment incontinence. Scale fell off with gentle pressure from R cheek. Will send to pathology. Suture removed per Dr. Hernandez. Very irritated today, hold hydroquinone here.    - Apply Vaseline x 1 week   - In one week, start Protopic BID, reviewed black botox warning      Procedures Performed:   -Scale was removed with gentle pressure from the R cheek.    Follow-up: 6 week(s) in-person, or earlier for new or changing lesions    Staff and Scribe:     Scribe Disclosure:   I, Rony Campos, am serving as a scribe to document services personally performed by this physician, Dr. Gilda Hernandez, based on data collection and the provider's statements to me.     Provider Disclosure:   The documentation recorded by the scribe accurately reflects the services I personally performed and the decisions made by me.    Gilda Hernandez MD    Department of Dermatology  [facility]: Phone: [phone], Fax:[phone]  [facility]: Phone: [phone], Fax: [phone]      ____________________________________________    CC: Suture Removal (punch bx 3/26/ r cheek) and Derm Problem (fu  melesma)    HPI:  Ms. Marium Moss is a(n) 40 year old female who presents today as a return patient for melasma.    Patient last seen on 03/26/21 for melasma. Patient was instructed to hold hydroquinone at that time, due to irritation. Biopsy was taken on the R cheek, came back as a lentigo    Today, she does still have hydroquinone cream available to use. She has been holding    Patient is otherwise feeling well, without additional skin concerns.    Labs Reviewed:  Skin, right cheek, punch:   - Consistent with lentigo with prominent pigment incontinence - (see   description)     Physical Exam:  Vitals: There were no vitals taken for this visit.  SKIN: Focused examination of the face was performed.  - Suture Right cheek,  Some scale was peeled off with gentle pressure   - No other lesions of concern on areas examined.     Medications:  Current Outpatient Medications   Medication     CLONAZEPAM PO     No current facility-administered medications for this visit.       Past Medical History:   Patient Active Problem List   Diagnosis     CARDIOVASCULAR SCREENING; LDL GOAL LESS THAN 160     Bipolar disorder (manic depression) (H)     Past Medical History:   Diagnosis Date     Bipolar affective disorder (H)      Depressive disorder, not elsewhere classified     sees therapist - Mary Ceja for med mgmt     Skin disease         CC Referred Self, MD  No address on file on close of this encounter.

## 2021-04-07 LAB — COPATH REPORT: NORMAL

## 2021-06-18 ENCOUNTER — OFFICE VISIT (OUTPATIENT)
Dept: DERMATOLOGY | Facility: CLINIC | Age: 41
End: 2021-06-18

## 2021-06-18 DIAGNOSIS — L98.8 RHYTIDES: Primary | ICD-10-CM

## 2021-06-18 DIAGNOSIS — L81.1 MELASMA: Primary | ICD-10-CM

## 2021-06-18 PROCEDURE — 99214 OFFICE O/P EST MOD 30 MIN: CPT | Performed by: DERMATOLOGY

## 2021-06-18 PROCEDURE — 96999 UNLISTED SPEC DERM SVC/PX: CPT | Performed by: DERMATOLOGY

## 2021-06-18 NOTE — PATIENT INSTRUCTIONS
Botulinum Toxin(Botox/Dysport) Cosmetic Information      I will have pain, redness, and swelling. I may have bruising, headache or discomfort at the site(s). Risks are asymmetry, numbness, twitching, brow droop, eyelid droop, headache, double vision, not enough effect or too much effect, difficulty whistling or drinking, loss of muscle tone, headache or infection. A touch-up or multiple treatments may be required.      About Botulinum Toxin (Botox/Dysport)  You have inquired about Botox cosmetic. Botulinum toxin is a purified protein derivative developed from bacteria. It has the ability to immobilize facial muscles that create dynamic wrinkles. Dynamic wrinkles develop due to muscle contraction, and over time become permanent folds in the skin, even when the muscles are not flexed. The use of Botox results in a very pleasing cosmetic effect for many people, leading to a more youthful, relaxed appearance. Botox can be used in combination with injectable fillers, chemical peels, and laser resurfacing to treat deeper wrinkles. It also has become an accepted form of treatment for hyperhidrosis, (or excessive sweating) in people who have not responded to other therapies.     With my treatment side effects may include bruising, headache or discomfort at the site(s). Asymmetry may occur and a touch-up may be required. Risks of this procedure include numbness, muscle twitching, brow or eyelid droop, headache, double vision, not enough effect or too much effect, difficulty whistling or drinking from a straw, loss of muscle tone, headache or infection      Post-Procedure Instructions:  Shower, facial cleansing, use of make-up and medicated creams is not restricted. Do not rub the treated area. Avoid exercise for the 24 hours following the procedure. Some people will experience bruising or eyelid ptosis (drooping) after injection. This is temporary and usually mild. Eyelid ptosis may be treated with special eye drops. Call  your doctor if you have any questions or concerns after your treatment.       Do not massage the area for 24 hours    Stay upright for 2-3 hours    Who should I call with questions?    [facility]: [phone]     [facility]: [phone]    For urgent needs outside of business hours call the [facility] at [phone] and ask for the resident on call

## 2021-06-18 NOTE — Clinical Note
Pleasant patient, wants chemical peels, must wiat 2 weeks because had botox, skin medica 7% to start

## 2021-06-18 NOTE — LETTER
6/18/2021         RE: Marium Moss  [address] [address]        Dear Colleague,    Thank you for referring your patient, Marium Moss, to the [facility]. Please see a copy of my visit note below.    [facility] Dermatology Note  Encounter Date: Jun 18, 2021  Office Visit     Dermatology Problem List:  1. Solar lentigines, seborrheic keratoses on face  2. Banal-appearing melanocytic nevi on the trunk  3. Melasma   - hydroquinone 8% started 6/18/21  -hydroquinone 4% cream - past tx  -Azelaic acid  -Chemical peels should be considered if not improved.     ____________________________________________    Assessment & Plan:    # Solar lentigines and melasma, not pregnant or breast feeding. Stable  - Patient is not on birth control.   - Increase to hydroquinone 8% daily to entire face for up to 12 weeks. Hold 1 week prior to any chemical peels.REviewed no FDA approved concentration and hold for irritation  -continue sunscreen  - Okay to start chemical peels.   For skin peels on the faceface: Discussed risk of peels including but not limited to erythema, peeling, redness, blisters, reactivation of HSV, rare risk of scarring, and hypo and hyperpigmentation. Peel handout provided. Patient will likely require 3-6 peels, approximately 4-6 weeks apart for improvement. Patient will be using 7% skin medica peel. Patient will discontinue differin/retinoinds/BP/efudex containing products 1 week prior and 1 week after. Riojas skin type III. Denies allergies to strawberries/aspirin/sulfa.  Has not been in Accutane in the last 6 months.  Not currently pregnant or breastfeeding. Patient has not had other  skin procedures in the last week and was counseled to avoid hair removal procedures including lasers, depilatory cream, waxing and electrolysis the week prior.  Consent obtained in clinic today and pre-peel instructions provided in written format.   -declines  tranexamic acid  -add cysteamine mask at follow up if not improving  - Hold protopic.    # Rhytides  - Botox. See cosmetic encounter.    Procedures Performed:   None.    Follow-up: 6 weeks by phone, 3 months in person.    Staff and Scribe:     Scribe Disclosure:   I, Stacy Parksin, am serving as a scribe to document services personally performed by this physician, Dr. Gilda Hernandez, based on data collection and the provider's statements to me.     Provider Disclosure:   The documentation recorded by the scribe accurately reflects the services I personally performed and the decisions made by me.    Gilda Hernandez MD    Department of Dermatology  [facility]: Phone: [phone], Fax:[phone]  [facility]: Phone: [phone], Fax: [phone]      ____________________________________________    CC: Derm Problem (melasma fu/ worsening )    HPI:  Ms. Marium Moss is a(n) 40 year old female who presents today as a return patient for melasma.    Last seen 4/2/21. At that time, patient was to start hydroquinone for three months. Patient was also to start kojic acid. A biopsy also determined mild epidermal hyperplasia and lichenoid inflammation on the right cheek. Patient was to apply vaseline, hold hydroquinone, and start protopic to this area.    Today, patient notes melasma is worsening.    Patient is otherwise feeling well, without additional skin concerns.    Labs Reviewed:  Derm path from 4/2/21 reviewed.    Physical Exam:  Vitals: There were no vitals taken for this visit.  SKIN: Focused examination of the face was performed.  - rhytides, forehead  -There are scattered light brown macules on sun exposed areas.   hyperpigmented patches on the cheeks  - No other lesions of concern on areas examined.     Medications:  Current Outpatient Medications   Medication     CLONAZEPAM PO      hydroquinone (JUNE) 4 % external cream     tacrolimus (PROTOPIC) 0.1 % external ointment     No current facility-administered medications for this visit.       Past Medical History:   Patient Active Problem List   Diagnosis     CARDIOVASCULAR SCREENING; LDL GOAL LESS THAN 160     Bipolar disorder (manic depression) (H)     Past Medical History:   Diagnosis Date     Bipolar affective disorder (H)      Depressive disorder, not elsewhere classified     sees therapist - Mary Ceja for med mgmt     Skin disease         CC Referred Self, MD  No address on file on close of this encounter.      Again, thank you for allowing me to participate in the care of your patient.        Sincerely,        Gilda Hernandez MD

## 2021-06-18 NOTE — PROGRESS NOTES
Botulinum Injection Procedure Note:  Cosmetic      ATTENDING STAFF SURGEON: Dr. Gilda Hernandez MD    RESIDENT SURGEON: N/A     NURSE: Maggy Corona LPN     ANESTHESIA:  None    PREOPERATIVE DIAGNOSIS: Rhytides    LOCATION: Forehead and glabella    LOT NO: [id number]    EXP DATE: 10/2023   OPERATION/PROCEDURE:    Intralesional botulinum toxin injection     Dilution with 0.5cc preserved sterile normal saline in a 50 Unit Botox Vial.    Total units of botulinum toxin: 46    10 additional units (total of 46) given today but patient paid for 36 due to addition mid procedure(with her verbal consent prior).    POSTOPERATIVE DIAGNOSIS:   SAME     PREPARATION:   Alcohol swab    DESCRIPTION OF OPERATION/PROCEDURE:   The nature and purpose of the procedure, associated risks including but not limited to bruising, headache or discomfort at the site(s), numbness, muscle twitching, brow or eyelid droop, headache, double vision, not enough effect or too much effect, difficulty whistling or drinking from a straw, loss of muscle tone, or infection. Possible consequences and complications, and alternative methods of treatment were explained in detail. The patient declined a personal or family history of neuromuscular disease prior to the procedure. The patient is not pregnant or breast feeding. A signed informed operative consent was obtained.    A total of 46 units was injected into the forehead and glabella.    The patient will pay the cosmetic fee for 36 units today.    Clinical Follow-Up: 2 weeks    Staff Involved:  Scribe/Staff    Scribe Disclosure:   Stacy ALEX, am serving as a scribe to document services personally performed by this physician, Dr. Gilda Hernandez, based on data collection and the provider's statements to me.     Provider Disclosure:   The documentation recorded by the scribe accurately reflects the services I personally performed and the decisions made by me.    Gilda Hernandez MD    Department  of Dermatology  [facility]: Phone: [phone], Fax:[phone]  [facility]: Phone: [phone], Fax: [phone]

## 2021-06-18 NOTE — NURSING NOTE
Marium Moss's goals for this visit include:   Chief Complaint   Patient presents with     Derm Problem     melasma fu      She requests these members of her care team be copied on today's visit information:     PCP: Clinic, [facility]    Referring Provider:  Referred Self, MD  No address on file    There were no vitals taken for this visit.    Do you need any medication refills at today's visit? No    Zahra Hebert, PAULIE on 6/18/2021 at 4:05 PM

## 2021-06-18 NOTE — PATIENT INSTRUCTIONS
Chemical Peel Preparation    Pre-procedure:    Reveal all medical or skin conditions, especially cold sores, use of prescriptions medications, recent peels or other facial procedure, etc. to help determine procedure plan    Arrive with a fully cleansed face, free of makeup    It is recommended that men not shave within a few hours prior to the treatment    Plan for photos to track progress and in case of side effects    Stop ALL of the following 7 days pre-procedure:    Topical vitamin A products such as retinols or retinoids (Differin, adapalene, Retin-A, tretinoin)    Products with glycolic acid, salicylic acid, Vitamin A, hydroquinone    Depilatory use/waxing    Chemical peel within the 8 weeks depending on peel depth    Excessively drying or irritating products    Electrolysis    Hair chemicals    2 weeks pre-procedure:    Cannot have Botox of fillers    4-6 weeks pre-procedure:    Free of cystic acne or herpes outbreak    If using Efudex treatment for pre-cancer, treatment needs to be completed    Who should I call with questions?    [facility]: [phone]    [facility]: [phone]    For urgent needs outside of business hours call the [facility] at [phone] and ask for the dermatology [id number]

## 2021-06-18 NOTE — LETTER
6/18/2021         RE: Marium Moss  [address] [address]        Dear Colleague,    Thank you for referring your patient, Marium Moss, to the [facility]. Please see a copy of my visit note below.    Botulinum Injection Procedure Note:  Cosmetic      ATTENDING STAFF SURGEON: Dr. Gilda Hernandez MD    RESIDENT SURGEON: N/A     NURSE: Maggy Corona LPN     ANESTHESIA:  None    PREOPERATIVE DIAGNOSIS: Rhytides    LOCATION: Forehead and glabella    LOT NO: [id number]    EXP DATE: 10/2023   OPERATION/PROCEDURE:    Intralesional botulinum toxin injection     Dilution with 0.5cc preserved sterile normal saline in a 50 Unit Botox Vial.    Total units of botulinum toxin: 46    10 additional units (total of 46) given today but patient paid for 36 due to addition mid procedure(with her verbal consent prior).    POSTOPERATIVE DIAGNOSIS:   SAME     PREPARATION:   Alcohol swab    DESCRIPTION OF OPERATION/PROCEDURE:   The nature and purpose of the procedure, associated risks including but not limited to bruising, headache or discomfort at the site(s), numbness, muscle twitching, brow or eyelid droop, headache, double vision, not enough effect or too much effect, difficulty whistling or drinking from a straw, loss of muscle tone, or infection. Possible consequences and complications, and alternative methods of treatment were explained in detail. The patient declined a personal or family history of neuromuscular disease prior to the procedure. The patient is not pregnant or breast feeding. A signed informed operative consent was obtained.    A total of 46 units was injected into the forehead and glabella.    The patient will pay the cosmetic fee for 36 units today.    Clinical Follow-Up: 2 weeks    Staff Involved:  Scribe/Staff    Scribe Disclosure:   Stacy ALEX, am serving as a scribe to document services personally performed by this physician, Dr. Gilda Hernandez, based on data  collection and the provider's statements to me.     Provider Disclosure:   The documentation recorded by the scribe accurately reflects the services I personally performed and the decisions made by me.    Gilda Hernandez MD    Department of Dermatology  [facility]: Phone: [phone], Fax:[phone]  [facility]: Phone: [phone], Fax: [phone]          Again, thank you for allowing me to participate in the care of your patient.        Sincerely,        Gilda Hernandez MD

## 2021-06-18 NOTE — NURSING NOTE
Marium Moss's goals for this visit include:   Chief Complaint   Patient presents with     Botox     She requests these members of her care team be copied on today's visit information:     PCP: Clinic, [facility]    Referring Provider:  No referring provider defined for this encounter.    There were no vitals taken for this visit.    Do you need any medication refills at today's visit? No    Zahra Hebert, CMA on 6/18/2021 at 4:31 PM

## 2021-06-18 NOTE — PROGRESS NOTES
[facility] Dermatology Note  Encounter Date: Jun 18, 2021  Office Visit     Dermatology Problem List:  1. Solar lentigines, seborrheic keratoses on face  2. Banal-appearing melanocytic nevi on the trunk  3. Melasma   - hydroquinone 8% started 6/18/21  -hydroquinone 4% cream - past tx  -Azelaic acid  -Chemical peels should be considered if not improved.     ____________________________________________    Assessment & Plan:    # Solar lentigines and melasma, not pregnant or breast feeding. Stable  - Patient is not on birth control.   - Increase to hydroquinone 8% daily to entire face for up to 12 weeks. Hold 1 week prior to any chemical peels.REviewed no FDA approved concentration and hold for irritation  -continue sunscreen  - Okay to start chemical peels.   For skin peels on the faceface: Discussed risk of peels including but not limited to erythema, peeling, redness, blisters, reactivation of HSV, rare risk of scarring, and hypo and hyperpigmentation. Peel handout provided. Patient will likely require 3-6 peels, approximately 4-6 weeks apart for improvement. Patient will be using 7% skin medica peel. Patient will discontinue differin/retinoinds/BP/efudex containing products 1 week prior and 1 week after. Riojas skin type III. Denies allergies to strawberries/aspirin/sulfa.  Has not been in Accutane in the last 6 months.  Not currently pregnant or breastfeeding. Patient has not had other  skin procedures in the last week and was counseled to avoid hair removal procedures including lasers, depilatory cream, waxing and electrolysis the week prior.  Consent obtained in clinic today and pre-peel instructions provided in written format.   -declines tranexamic acid  -add cysteamine mask at follow up if not improving  - Hold protopic.    # Rhytides  - Botox. See cosmetic encounter.    Procedures Performed:   None.    Follow-up: 6 weeks by phone, 3 months in person.    Staff and Scribe:     Scribe  Disclosure:   I, Stacy Parksin, am serving as a scribe to document services personally performed by this physician, Dr. Gilda Hernandez, based on data collection and the provider's statements to me.     Provider Disclosure:   The documentation recorded by the scribe accurately reflects the services I personally performed and the decisions made by me.    Gilda Hernandez MD    Department of Dermatology  [facility] [place]: Phone: [phone], Fax:[phone]  [facility]: Phone: [phone], Fax: [phone]      ____________________________________________    CC: Derm Problem (melasma fu/ worsening )    HPI:  Ms. Marium Moss is a(n) 40 year old female who presents today as a return patient for melasma.    Last seen 4/2/21. At that time, patient was to start hydroquinone for three months. Patient was also to start kojic acid. A biopsy also determined mild epidermal hyperplasia and lichenoid inflammation on the right cheek. Patient was to apply vaseline, hold hydroquinone, and start protopic to this area.    Today, patient notes melasma is worsening.    Patient is otherwise feeling well, without additional skin concerns.    Labs Reviewed:  Derm path from 4/2/21 reviewed.    Physical Exam:  Vitals: There were no vitals taken for this visit.  SKIN: Focused examination of the face was performed.  - rhytides, forehead  -There are scattered light brown macules on sun exposed areas.   hyperpigmented patches on the cheeks  - No other lesions of concern on areas examined.     Medications:  Current Outpatient Medications   Medication     CLONAZEPAM PO     hydroquinone (JUNE) 4 % external cream     tacrolimus (PROTOPIC) 0.1 % external ointment     No current facility-administered medications for this visit.       Past Medical History:   Patient Active Problem List   Diagnosis     CARDIOVASCULAR SCREENING; LDL  GOAL LESS THAN 160     Bipolar disorder (manic depression) (H)     Past Medical History:   Diagnosis Date     Bipolar affective disorder (H)      Depressive disorder, not elsewhere classified     sees therapist - Mary Ceja for med mgmt     Skin disease         CC Referred Self, MD  No address on file on close of this encounter.

## 2021-06-21 ENCOUNTER — TELEPHONE (OUTPATIENT)
Dept: DERMATOLOGY | Facility: CLINIC | Age: 41
End: 2021-06-21

## 2021-06-21 DIAGNOSIS — Z29.9 PROPHYLACTIC MEASURE: Primary | ICD-10-CM

## 2021-06-21 RX ORDER — VALACYCLOVIR HYDROCHLORIDE 500 MG/1
TABLET, FILM COATED ORAL
Qty: 14 TABLET | Refills: 5 | Status: CANCELLED | OUTPATIENT
Start: 2021-06-21

## 2021-06-21 NOTE — TELEPHONE ENCOUNTER
Gilda Hernandez MD sent to Angie Encarnacion RN             Pleasant patient, wants chemical peels, must wiat 2 weeks because had botox, skin medica 7% to start      I spoke with Marium and reviewed chemical peel prep and post care with her.  I quoted her $100 per Illuminize peel or $270 for a package of 3.    I reminded her to stop waxing and stop compounded medication 7 days prior to peel.  I reviewed the need for strict sun protection before and after peel.    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  I reviewed the use of Valtrex and will confirm with Dr. Hernandez if she would like this prescribed as a prophylactic medication.    Pharmacy: [facility]    Angie Encarnacion RN

## 2021-06-21 NOTE — TELEPHONE ENCOUNTER
Gilda Hernandez MD  You 2 hours ago (1:52 PM)     If cold sores more than 4 Times per year or easily triggered okay     Otherwise Vaseline protection of lip should be okay      I spoke with Marium and she does not get cold sores more than 4 times per year and is not easily triggered.  I reviewed we would proceed with Vaseline per protocol.    Angie Encarnacion RN

## 2021-07-20 NOTE — TELEPHONE ENCOUNTER
"  CC:  \"Horrible taste in my mouth\" 1 week      > Been going on for the past week or so    > No fevers   > No dental pain   > Not pregnant   > \"bitter\" (vs sour) taste in mouth     Last DDS appt was <1 yr ago - no changes since then      Not have a regular site of care       A/P:   > Brush / floss / mouthwash as needed   > Salt water gargles as well may be helpful     Consider [place] (or similar) site of care if urgent        Jean Wynn RN   Triage and Medication Refills             Reason for Disposition    All other mouth symptoms (Exceptions: dry mouth from not drinking enough liquids, chapped lips)    Protocols used: MOUTH SYMPTOMS-A-AH      "

## 2021-08-19 ENCOUNTER — VIRTUAL VISIT (OUTPATIENT)
Dept: DERMATOLOGY | Facility: CLINIC | Age: 41
End: 2021-08-19
Payer: COMMERCIAL

## 2021-08-19 DIAGNOSIS — R23.8 FACIAL VOLUME DEPLETION: Primary | ICD-10-CM

## 2021-08-19 PROCEDURE — 99213 OFFICE O/P EST LOW 20 MIN: CPT | Mod: 95 | Performed by: DERMATOLOGY

## 2021-08-19 NOTE — PROGRESS NOTES
[facility] Dermatology Note  Encounter Date: Aug 19, 2021  Store-and-Forward and Telephone ([phone]). Location of teledermatologist: [facility].  Start time: 4:45pm. End time: 4:53pm.    Dermatology Problem List:  1. Solar lentigines, seborrheic keratoses on face  2. Banal-appearing melanocytic nevi on the trunk  3. Melasma   - hydroquinone 8% started 6/18/21  -hydroquinone 4% cream - past tx  -Azelaic acid  -Chemical peels should be considered if not improved.      ____________________________________________     Assessment & Plan:     # Solar lentigines and melasma, not pregnant or breast feeding. Stable. Only 4 weeks of hydroquinone  - Increase to hydroquinone 8% daily to entire face for 8 more weeks 1-twice daily, skip if irritating  - Continue sunscreen    -Future: add cysteamine mask at follow up if not improving, Dr. Christopher for second opinion also reviewed        # Rhytides- happy with results  - wants more units in the glabella  -consider filler for facial volume loss  1cc resytlane lyft    Procedures Performed:    None    Follow-up: for filler, botox touch up and melasma in October or early per nursing  Staff:     Gilda Hernandez MD    Department of Dermatology  [facility]: Phone: [phone], Fax:[phone]  [facility]: Phone: [phone], Fax: [phone]      ____________________________________________    CC: Derm Problem (melasma)    HPI:  Ms. Marium Moss is a(n) 41 year old female who presents today as a return patient for melasma. Stable. Only used hydroquinone for 4 weeks. Using sunscreen. Never started peels.     Patient is otherwise feeling well, without additional skin concerns.    Labs Reviewed:  NA    Physical Exam:  Vitals: There were no vitals taken for this visit.  SKIN: Teledermatology photos were reviewed;  image quality and interpretability:  acceptable. Image date: today.  - hyperpigmentation forheadand cheeks    - No other lesions of concern on areas examined.     Medications:  Current Outpatient Medications   Medication     CLONAZEPAM PO     COMPOUNDED NON-CONTROLLED SUBSTANCE (CMPD RX) - PHARMACY TO MIX COMPOUNDED MEDICATION     tacrolimus (PROTOPIC) 0.1 % external ointment     Current Facility-Administered Medications   Medication     Botulinum Toxin Type A (Cosm) (BOTOX-COSMETIC) 50 units injection 36 Units      Past Medical/Surgical History:   Patient Active Problem List   Diagnosis     CARDIOVASCULAR SCREENING; LDL GOAL LESS THAN 160     Bipolar disorder (manic depression) (H)     Past Medical History:   Diagnosis Date     Bipolar affective disorder (H)      Depressive disorder, not elsewhere classified     sees therapist - Mary Ceja for med mgmt     Skin disease        CC Referred Self, MD  No address on file on close of this encounter.    Teledermatology Nurse Call Patients:   Chief Complaint   Patient presents with     Derm Problem     melasma     Are you  in the [facility] at the time of the encounter? yes    Today's visit will be billed to you and your insurance.    A teledermatology visit is not as thorough as an in-person visit and the quality of the photograph sent may not be of the same quality as that taken by the dermatology clinic.     Zahra Hebert CMA on 8/19/2021 at 2:46 PM

## 2021-08-19 NOTE — PATIENT INSTRUCTIONS
[facility] Dermatology Visit    Thank you for allowing us to participate in your care. Your findings, instructions and follow-up plan are as follows:         When should I call my doctor?    If you are worsening or not improving, please, contact us or seek urgent care as noted below.     Who should I call with questions (adults)?    [facility] (adult and pediatric): [phone]     [facility] (adult): [phone]    For urgent needs outside of business hours call the [facility] at [phone] and ask for the dermatology resident on call    If this is a medical emergency and you are unable to reach an ER, Call 911      Who should I call with questions (pediatric)?  [facility]- Pediatric Dermatology  Dr. Nel Conte, Dr. Karmen Albarran, Dr. Karen Rosas, Isabelle Alcaraz, PA  Dr. Mely Nathan, Dr. Nella Lamb & Dr. Edvin Vang  Non Urgent  Nurse Triage Line; [phone]- Natalie and Nyla RN Care Coordinators   Marianne (/Complex ) [phone]    If you need a prescription refill, please contact your pharmacy. Refills are approved or denied by our Physicians during normal business hours, Monday through Fridays  Per office policy, refills will not be granted if you have not been seen within the past year (or sooner depending on your child's condition)    Scheduling Information:  Pediatric Appointment Scheduling and Call Center [phone]  Radiology Scheduling- [phone]  Sedation Unit Scheduling- [phone]  [facility] Scheduling- General [phone]; Pediatric Dermatology [phone]  Main  Services: [phone]  Portuguese: [phone]  New Zealander: [phone]  Hmong/Nepali/Estonian: [phone]  Preadmission Nursing Department Fax Number: [phone] (Fax all pre-operative paperwork to this number)    For urgent matters arising during evenings,  weekends, or holidays that cannot wait for normal business hours please call [phone] and ask for the Dermatology Resident On-Call to be paged.

## 2021-08-19 NOTE — LETTER
8/19/2021         RE: Marium Moss  [address] [address]        Dear Colleague,    Thank you for referring your patient, Marium Moss, to the [facility]. Please see a copy of my visit note below.    [facility] Dermatology Note  Encounter Date: Aug 19, 2021  Store-and-Forward and Telephone ([phone]). Location of teledermatologist: [facility].  Start time: 4:45pm. End time: 4:53pm.    Dermatology Problem List:  1. Solar lentigines, seborrheic keratoses on face  2. Banal-appearing melanocytic nevi on the trunk  3. Melasma   - hydroquinone 8% started 6/18/21  -hydroquinone 4% cream - past tx  -Azelaic acid  -Chemical peels should be considered if not improved.      ____________________________________________     Assessment & Plan:     # Solar lentigines and melasma, not pregnant or breast feeding. Stable. Only 4 weeks of hydroquinone  - Increase to hydroquinone 8% daily to entire face for 8 more weeks 1-twice daily, skip if irritating  - Continue sunscreen    -Future: add cysteamine mask at follow up if not improving, Dr. Christopher for second opinion also reviewed        # Rhytides- happy with results  - wants more units in the glabella  -consider filler for facial volume loss  1cc resytlane lyft    Procedures Performed:    None    Follow-up: for filler, botox touch up and melasma in October or early per nursing  Staff:     Gilda Hernandez MD    Department of Dermatology  [facility]: Phone: [phone], Fax:[phone]  [facility]: Phone: [phone], Fax: [phone]      ____________________________________________    CC: Derm Problem (melasma)    HPI:  Ms. Marium Moss is a(n) 41 year old female who presents today as a return patient for melasma. Stable. Only used hydroquinone for 4 weeks.  Using sunscreen. Never started peels.     Patient is otherwise feeling well, without additional skin concerns.    Labs Reviewed:  NA    Physical Exam:  Vitals: There were no vitals taken for this visit.  SKIN: Teledermatology photos were reviewed; image quality and interpretability:  acceptable. Image date: today.  - hyperpigmentation forheadand cheeks    - No other lesions of concern on areas examined.     Medications:  Current Outpatient Medications   Medication     CLONAZEPAM PO     COMPOUNDED NON-CONTROLLED SUBSTANCE (CMPD RX) - PHARMACY TO MIX COMPOUNDED MEDICATION     tacrolimus (PROTOPIC) 0.1 % external ointment     Current Facility-Administered Medications   Medication     Botulinum Toxin Type A (Cosm) (BOTOX-COSMETIC) 50 units injection 36 Units      Past Medical/Surgical History:   Patient Active Problem List   Diagnosis     CARDIOVASCULAR SCREENING; LDL GOAL LESS THAN 160     Bipolar disorder (manic depression) (H)     Past Medical History:   Diagnosis Date     Bipolar affective disorder (H)      Depressive disorder, not elsewhere classified     sees therapist - Mary Ceja for med mgmt     Skin disease        CC Referred Self, MD  No address on file on close of this encounter.    Teledermatology Nurse Call Patients:   Chief Complaint   Patient presents with     Derm Problem     melasma     Are you  in the [facility] at the time of the encounter? yes    Today's visit will be billed to you and your insurance.    A teledermatology visit is not as thorough as an in-person visit and the quality of the photograph sent may not be of the same quality as that taken by the dermatology clinic.     Zahra Hebert, PAULIE on 8/19/2021 at 2:46 PM                                                                                                                                                                                                                                       Again, thank you for allowing me  to participate in the care of your patient.        Sincerely,        Gilda Hernandez MD

## 2021-08-19 NOTE — Clinical Note
Can we add on for botox touch up  in the next 4 weeks. If we have time for filler that day great. Otherwise if she wants we can do both in october

## 2021-09-25 ENCOUNTER — HEALTH MAINTENANCE LETTER (OUTPATIENT)
Age: 41
End: 2021-09-25

## 2021-09-30 NOTE — PROGRESS NOTES
Soft Tissue Augmentation Procedure Note: Cosmetic      Dermatology Problem List:  1. Solar lentigines, seborrheic keratoses on face  2. Banal-appearing melanocytic nevi on the trunk  3. Melasma   - hydroquinone 8% started 6/18/21  -hydroquinone 4% cream - past tx  -Azelaic acid  -Chemical peels should be considered if not improved.     Procedure Date: 10/1/2021    Attending Staff: Dr. Hernandez    Resident: N/a    Assistant: Zahra Hebert CMA    Diagnosis: Facial rhytides and facial volume depletion    Location: bilateral cheeks  Product: Restylane Lyft  Amount: 0.9mL  Lot #: [id number]  Exp Date: 12-    Description of Operation/Procedure:   The nature and purpose of the procedure, associated risks, possible consequences and complications, and alternative methods of treatment were explained in detail including but not limited to bruising, pain, redness,lumps/bumps, granuloma formation, scar blindness, stroke, ulceration, ischemia, under correction, over correction, swelling, possible need for multiple treatments, infection, granuloma, pain, dyspigmentation, numbness, weakness or tingling were explained to the patient. We discussed that multiple treatments may be required. Injections on bone.  The patient verbalized understanding. Photo consent and signed informed consent were obtained.Time-out was performed and patient denied history of severe allergy to bees.  Denies recent upcoming dental procedures or covid vaccine in the last 2 weeks.      The facial areas were cleansed with technicare and injections were performed as above. No complications noted.   The patient tolerated the procedure well and there were no complications. Ice was provided post-procedure. The patient was provided after care instructions and will send a picture later tonight.             Clinical Follow-Up: 3 months for  gigi at [facility].      Staff Involved:  Scribe/Staff     Scribe Disclosure:   Stacy ALEX, am serving as a scribe to  document services personally performed by this physician, Dr. Gilda Hernandez, based on data collection and the provider's statements to me.     Provider Disclosure:   The documentation recorded by the scribe accurately reflects the services I personally performed and the decisions made by me.    Gilda Hernandez MD    Department of Dermatology  [facility] [place]: Phone: [phone], Fax:[phone]  [facility]: Phone: [phone], Fax: [phone]

## 2021-10-01 ENCOUNTER — TELEPHONE (OUTPATIENT)
Dept: DERMATOLOGY | Facility: CLINIC | Age: 41
End: 2021-10-01

## 2021-10-01 ENCOUNTER — OFFICE VISIT (OUTPATIENT)
Dept: DERMATOLOGY | Facility: CLINIC | Age: 41
End: 2021-10-01

## 2021-10-01 ENCOUNTER — MYC MEDICAL ADVICE (OUTPATIENT)
Dept: DERMATOLOGY | Facility: CLINIC | Age: 41
End: 2021-10-01

## 2021-10-01 DIAGNOSIS — R23.8 FACIAL VOLUME DEPLETION: Primary | ICD-10-CM

## 2021-10-01 PROCEDURE — 96999 UNLISTED SPEC DERM SVC/PX: CPT | Performed by: DERMATOLOGY

## 2021-10-01 NOTE — Clinical Note
2-3 months in person at [facility]. Reason is that is where I have the cyspera to give her. Runs about $200 I think. Please, give her estimate. Lasts about 3 months.

## 2021-10-01 NOTE — TELEPHONE ENCOUNTER
[facility] Call Center    Phone Message    May a detailed message be left on voicemail: yes     Reason for Call: Appointment Intake    Referring Provider Name: Dr. Hernandez  Diagnosis and/or Symptoms: Dr. Hernandez wanted Pt to schedule at [facility] location for Cispera. I asked Marianne if this is a chemical peel and Pt stated no. I am not sure where this fit for scheduling in Dermatology. Please review and call Pt back to schedule. Thank you    Action Taken: Message routed to:  [facility] ([facility] Derm    Travel Screening: Not Applicable

## 2021-10-01 NOTE — NURSING NOTE
Marium Moss's goals for this visit include:   Chief Complaint   Patient presents with     Botox     Glabella     Filler     cheeks     Derm Problem     Melasma - can't apply hydroquinone for more than 2 days in a row due to skin irritation       She requests these members of her care team be copied on today's visit information: n/a      PCP: Clinic, [facility]    Referring Provider:  No referring provider defined for this encounter.    There were no vitals taken for this visit.    Do you need any medication refills at today's visit? No.  Angie Encarnacion RN

## 2021-10-01 NOTE — LETTER
10/1/2021         RE: Marium Moss  [address] [address]        Dear Colleague,    Thank you for referring your patient, Marium Moss, to the [facility]. Please see a copy of my visit note below.    Soft Tissue Augmentation Procedure Note: Cosmetic      Dermatology Problem List:  1. Solar lentigines, seborrheic keratoses on face  2. Banal-appearing melanocytic nevi on the trunk  3. Melasma   - hydroquinone 8% started 6/18/21  -hydroquinone 4% cream - past tx  -Azelaic acid  -Chemical peels should be considered if not improved.     Procedure Date: 10/1/2021    Attending Staff: Dr. Hernandez    Resident: N/a    Assistant: Zahra Hebert CMA    Diagnosis: Facial rhytides and facial volume depletion    Location: bilateral cheeks  Product: Restylane Lyft  Amount: 0.9mL  Lot #: [id number]  Exp Date: 12-    Description of Operation/Procedure:   The nature and purpose of the procedure, associated risks, possible consequences and complications, and alternative methods of treatment were explained in detail including but not limited to bruising, pain, redness,lumps/bumps, granuloma formation, scar blindness, stroke, ulceration, ischemia, under correction, over correction, swelling, possible need for multiple treatments, infection, granuloma, pain, dyspigmentation, numbness, weakness or tingling were explained to the patient. We discussed that multiple treatments may be required. Injections on bone.  The patient verbalized understanding. Photo consent and signed informed consent were obtained.Time-out was performed and patient denied history of severe allergy to bees.  Denies recent upcoming dental procedures or covid vaccine in the last 2 weeks.      The facial areas were cleansed with technicare and injections were performed as above. No complications noted.   The patient tolerated the procedure well and there were no complications. Ice was provided post-procedure. The patient  was provided after care instructions and will send a picture later tonight.             Clinical Follow-Up: 3 months for  cyspera at [facility].      Staff Involved:  Scribe/Staff     Scribe Disclosure:   I, Stacy Kelley, am serving as a scribe to document services personally performed by this physician, Dr. Gilda Hernandez, based on data collection and the provider's statements to me.     Provider Disclosure:   The documentation recorded by the scribe accurately reflects the services I personally performed and the decisions made by me.    Gilda Hernandez MD    Department of Dermatology  [facility]: Phone: [phone], Fax:[phone]  [facility]: Phone: [phone], Fax: [phone]          Again, thank you for allowing me to participate in the care of your patient.        Sincerely,        Gilda Hernandez MD

## 2021-10-01 NOTE — NURSING NOTE
The following medication was applied to skin:    MEDICATION: Benzocaine 20%/Lidocaine 8%/Tetracaine 4%  ROUTE: Topical   SITE: cheeks  DOSE: ~1 g  LOT #: [id number]  : [employer]  EXPIRATION DATE: 2/15/22  Was there drug waste? No  Multi-dose vial: Yes    Angie Encarnacion RN

## 2021-10-01 NOTE — PATIENT INSTRUCTIONS
Filler Information:    I will have pain, bruising, redness, and swelling after the procedure and lasting for approximately 1-3 weeks. Risks are lumps/bumps, skin discoloration, bleeding, numbness, infection, granuloma formation, scar, ulceration, under correction, over correction and rarely, risks of stroke and blindness. Multiple treatments may be required.        Dermal fillers are injected into the skin to soften crease or folds, support areas of volume loss or contour specific facial areas.     You may experience a mild to moderate amount of stinging or aching sensation post injection.    To ensure an even correction, the physician will massage the area treated, which may cause a temporary amount of redness to your skin.    Bruising at the site of injection is common and may last two weeks    Temporary minimal to moderate swelling can be expected, which should gradually improve following injection    It is normal to experience some tenderness at the treatment site for a few days    After treatment care instructions:    Apply an ice pack or cold compress to the injection area after treatment to help reduce swelling.    Keep your head elevated(even while sleeping) as much as possible and avoid sleeping on your side or stomach    No alcohol consumption or exercise for the first 24 hours after treatment.    Do not touch the treated area for 6 hours    You may use make-up the following day    You may return to normal/routine activities but you should check with your physician for their recommendation     Avoid excessive scrubbing or rubbing of the injection area    If you have previously suffered from cold sores, there is a risk that the needle punctures around the mouth/lips could contribute to another recurrence    Immediately report to your physician if you have any of the following:    Delayed swelling happening 7-14 days after treatment    Numbness lasting 3-4 days    Muscle weakness in the area of  injection    Severe pain    Dusky discoloration of one half of the face    Changes in vision or eye pain    Cold sore    Who should I call with questions?    [facility]: [phone]     [facility]: [phone]    For urgent needs outside of business hours call the [facility] at [phone] and ask for the resident on call

## 2021-10-15 ENCOUNTER — MYC MEDICAL ADVICE (OUTPATIENT)
Dept: DERMATOLOGY | Facility: CLINIC | Age: 41
End: 2021-10-15

## 2021-10-15 ENCOUNTER — OFFICE VISIT (OUTPATIENT)
Dept: DERMATOLOGY | Facility: CLINIC | Age: 41
End: 2021-10-15
Payer: COMMERCIAL

## 2021-10-15 VITALS — DIASTOLIC BLOOD PRESSURE: 87 MMHG | TEMPERATURE: 97.6 F | HEART RATE: 73 BPM | SYSTOLIC BLOOD PRESSURE: 136 MMHG

## 2021-10-15 DIAGNOSIS — R21 RASH: Primary | ICD-10-CM

## 2021-10-15 PROCEDURE — 99207 PR NO CHARGE LOS: CPT | Performed by: DERMATOLOGY

## 2021-10-15 RX ORDER — HYDROCORTISONE 2.5 %
CREAM (GRAM) TOPICAL
Qty: 30 G | Refills: 0 | Status: SHIPPED | OUTPATIENT
Start: 2021-10-15

## 2021-10-15 RX ORDER — PREDNISONE 10 MG/1
TABLET ORAL
Qty: 18 TABLET | Refills: 0 | Status: SHIPPED | OUTPATIENT
Start: 2021-10-15 | End: 2021-10-15

## 2021-10-15 RX ORDER — HYDROCORTISONE 2.5 %
CREAM (GRAM) TOPICAL
Qty: 30 G | Refills: 0 | Status: SHIPPED | OUTPATIENT
Start: 2021-10-15 | End: 2021-10-15

## 2021-10-15 RX ORDER — PREDNISONE 10 MG/1
TABLET ORAL
Qty: 18 TABLET | Refills: 0 | Status: SHIPPED | OUTPATIENT
Start: 2021-10-15 | End: 2022-01-05

## 2021-10-15 NOTE — PATIENT INSTRUCTIONS
Filler Information:    I will have pain, bruising, redness, and swelling after the procedure and lasting for approximately 1-3 weeks. Risks are lumps/bumps, skin discoloration, bleeding, numbness, infection, granuloma formation, scar, ulceration, under correction, over correction and rarely, risks of stroke and blindness. Multiple treatments may be required.        Dermal fillers are injected into the skin to soften crease or folds, support areas of volume loss or contour specific facial areas.     You may experience a mild to moderate amount of stinging or aching sensation post injection.    To ensure an even correction, the physician will massage the area treated, which may cause a temporary amount of redness to your skin.    Bruising at the site of injection is common and may last two weeks    Temporary minimal to moderate swelling can be expected, which should gradually improve following injection    It is normal to experience some tenderness at the treatment site for a few days    After treatment care instructions:    Apply an ice pack or cold compress to the injection area after treatment to help reduce swelling.    Keep your head elevated(even while sleeping) as much as possible and avoid sleeping on your side or stomach    No alcohol consumption or exercise for the first 24 hours after treatment.    Do not touch the treated area for 6 hours    You may use make-up the following day    You may return to normal/routine activities but you should check with your physician for their recommendation     Avoid excessive scrubbing or rubbing of the injection area    If you have previously suffered from cold sores, there is a risk that the needle punctures around the mouth/lips could contribute to another recurrence    Immediately report to your physician if you have any of the following:    Delayed swelling happening 7-14 days after treatment    Numbness lasting 3-4 days    Muscle weakness in the area of  injection    Severe pain    Dusky discoloration of one half of the face    Changes in vision or eye pain    Cold sore    Who should I call with questions?    [facility]: [phone]     [facility]: [phone]    For urgent needs outside of business hours call the [facility] at [phone] and ask for the resident on call

## 2021-10-15 NOTE — TELEPHONE ENCOUNTER
Left message to call back clinic regarding mc message.   Zahra Hebert, [facility] on 10/15/2021 at 12:57 PM

## 2021-10-15 NOTE — LETTER
10/15/2021         RE: Marium Moss  [address] [address]        Dear Colleague,    Thank you for referring your patient, Marium Moss, to the [facility]. Please see a copy of my visit note below.    See record  Entered on 10/16 but visit was on 10/15 for filler check      Again, thank you for allowing me to participate in the care of your patient.        Sincerely,        Gilda Hernandez MD

## 2021-10-15 NOTE — NURSING NOTE
Marium Moss's goals for this visit include:   Chief Complaint   Patient presents with     Derm Problem     filler check      She requests these members of her care team be copied on today's visit information:     PCP: Clinic, [facility] [place]    Referring Provider:  No referring provider defined for this encounter.    There were no vitals taken for this visit.    Do you need any medication refills at today's visit? No  Zahra Hebert, PAULIE on 10/15/2021 at 4:51 PM

## 2021-10-16 ENCOUNTER — MYC MEDICAL ADVICE (OUTPATIENT)
Dept: DERMATOLOGY | Facility: CLINIC | Age: 41
End: 2021-10-16
Payer: COMMERCIAL

## 2021-10-16 PROCEDURE — 99207 PR NO BILLABLE SERVICE THIS VISIT: CPT | Performed by: DERMATOLOGY

## 2021-10-16 NOTE — TELEPHONE ENCOUNTER
In person record, dermatology    S: see nursing note.Patient reports redness that start Friday am. On cheeks, forehead chin. No pain and not tender. Is using compounded cream. No recent illneeses    O: In clinic very faint macular erythema in person. No nodules. No tenderness. Areas not injected are involved    A/P  Facial rash, most likely retinoid dermatitis on exam. Weekend approaching. No evidence of infection. Possibly but unlikely hypersensivity to filler. Will treat both for safety  -hold hydroquinone compound with tretinoin for 2 weeks  -hydrocortisone 2.5% cream for 1-2 weeks  -prednisone 30mg taper over 9 days, reviewed risk of sleep issues and increased hunger  - contact clinic or use resident number for emergency  - send photos tomorrow am  Gilda Hernandez MD    Department of Dermatology  [facility]: Phone: [phone], Fax:[phone]  [facility]: Phone: [phone], Fax: [phone]

## 2021-11-19 ENCOUNTER — OFFICE VISIT (OUTPATIENT)
Dept: DERMATOLOGY | Facility: CLINIC | Age: 41
End: 2021-11-19

## 2021-11-19 DIAGNOSIS — L98.8 RHYTIDES: Primary | ICD-10-CM

## 2021-11-19 PROCEDURE — 96999 UNLISTED SPEC DERM SVC/PX: CPT | Performed by: DERMATOLOGY

## 2021-11-19 NOTE — LETTER
11/19/2021         RE: Marium Moss  [address] [address]        Dear Colleague,    Thank you for referring your patient, Marium Moss, to the [facility]. Please see a copy of my visit note below.    Botulinum Injection Procedure Note:  Cosmetic    Dermatology Problem List:  1. Solar lentigines, seborrheic keratoses on face  2. Banal-appearing melanocytic nevi on the trunk  3. Melasma   - hydroquinone 8% started 6/18/21  -hydroquinone 4% cream - past tx  -Azelaic acid  -Chemical peels should be considered if not improved.     ATTENDING STAFF SURGEON: Dr. Gilda Hernandez     NURSE: Zahra Hebert CMA     ANESTHESIA:   None    PREOPERATIVE DIAGNOSIS:   Rhytides    LOCATION: Forehead and glabella    LOT NO: [id number]    EXP DATE: 03/2024    OPERATION/PROCEDURE:   Intralesional botulinum toxin injection     Dilution with 0.5 preserved sterile normal saline in a 50 Unit Botox Vial.    Total units of botulinum toxin: 37 (3 more added to glabella)    POSTOPERATIVE DIAGNOSIS:   SAME     PREPARATION:   Alcohol swab    DESCRIPTION OF OPERATION/PROCEDURE:   The nature and purpose of the procedure, associated risks including but not limited to bruising, headache or discomfort at the site(s), numbness, muscle twitching, brow or eyelid droop, headache, double vision, not enough effect or too much effect, difficulty whistling or drinking from a straw, loss of muscle tone, or infection. Possible consequences and complications, and alternative methods of treatment were explained in detail. The patient declined a personal or family history of neuromuscular disease prior to the procedure. The patient is not pregnant or breast feeding.   A signed informed operative consent was obtained.    The patient was taken to the operative suite and properly positioned. The area to be treated was defined and confirmed by the patient and physician. The area for Botox injection was marked.    Cosmetic  procedure: A total of 34 Units were injected into sites at the forehead and glabella. The patient tolerated the procedure well and there were no complications noted. Patient was given wound care instructions and will follow-up in approximately 14 days.       Clinical Follow-Up: 2 weeks    Additional History: Facial rash from previous viisit has resolved    Staff Involved:  Scribe/Staff    Scribe Disclosure:   I, Rony Campos, am serving as a scribe to document services personally performed by this physician, Dr. Gilda Hernandez, based on data collection and the provider's statements to me.     Provider Disclosure:   The documentation recorded by the scribe accurately reflects the services I personally performed and the decisions made by me.    Gilda Hernandez MD    Department of Dermatology  [facility]: Phone: [phone], Fax:[phone]  [facility]: Phone: [phone], Fax: [phone]              Again, thank you for allowing me to participate in the care of your patient.        Sincerely,        Gilda Hernandez MD

## 2021-11-19 NOTE — PATIENT INSTRUCTIONS
Botulinum Toxin(Botox/Dysport) Cosmetic Information      I will have pain, redness, and swelling. I may have bruising, headache or discomfort at the site(s). Risks are asymmetry, numbness, twitching, brow droop, eyelid droop, headache, double vision, not enough effect or too much effect, difficulty whistling or drinking, loss of muscle tone, headache or infection. A touch-up or multiple treatments may be required.      About Botulinum Toxin (Botox/Dysport)  You have inquired about Botox cosmetic. Botulinum toxin is a purified protein derivative developed from bacteria. It has the ability to immobilize facial muscles that create dynamic wrinkles. Dynamic wrinkles develop due to muscle contraction, and over time become permanent folds in the skin, even when the muscles are not flexed. The use of Botox results in a very pleasing cosmetic effect for many people, leading to a more youthful, relaxed appearance. Botox can be used in combination with injectable fillers, chemical peels, and laser resurfacing to treat deeper wrinkles. It also has become an accepted form of treatment for hyperhidrosis, (or excessive sweating) in people who have not responded to other therapies.     With my treatment side effects may include bruising, headache or discomfort at the site(s). Asymmetry may occur and a touch-up may be required. Risks of this procedure include numbness, muscle twitching, brow or eyelid droop, headache, double vision, not enough effect or too much effect, difficulty whistling or drinking from a straw, loss of muscle tone, headache or infection      Post-Procedure Instructions:  Shower, facial cleansing, use of make-up and medicated creams is not restricted. Do not rub the treated area. Avoid exercise for the 24 hours following the procedure. Some people will experience bruising or eyelid ptosis (drooping) after injection. This is temporary and usually mild. Eyelid ptosis may be treated with special eye drops. Call  your doctor if you have any questions or concerns after your treatment.       Do not massage the area for 24 hours    Stay upright for 2-3 hours    Who should I call with questions?    [facility]: [phone]     [facility]: [phone]    For urgent needs outside of business hours call the [facility] at [phone] and ask for the resident on call

## 2021-11-19 NOTE — PROGRESS NOTES
Botulinum Injection Procedure Note:  Cosmetic    Dermatology Problem List:  1. Solar lentigines, seborrheic keratoses on face  2. Banal-appearing melanocytic nevi on the trunk  3. Melasma   - hydroquinone 8% started 6/18/21  -hydroquinone 4% cream - past tx  -Azelaic acid  -Chemical peels should be considered if not improved.     ATTENDING STAFF SURGEON: Dr. Gilda Hernandez     NURSE: Zahra Hebert CMA     ANESTHESIA:   None    PREOPERATIVE DIAGNOSIS:   Rhytides    LOCATION: Forehead and glabella    LOT NO: [id number]    EXP DATE: 03/2024    OPERATION/PROCEDURE:   Intralesional botulinum toxin injection     Dilution with 0.5 preserved sterile normal saline in a 50 Unit Botox Vial.    Total units of botulinum toxin: 37 (3 more added to glabella)    POSTOPERATIVE DIAGNOSIS:   SAME     PREPARATION:   Alcohol swab    DESCRIPTION OF OPERATION/PROCEDURE:   The nature and purpose of the procedure, associated risks including but not limited to bruising, headache or discomfort at the site(s), numbness, muscle twitching, brow or eyelid droop, headache, double vision, not enough effect or too much effect, difficulty whistling or drinking from a straw, loss of muscle tone, or infection. Possible consequences and complications, and alternative methods of treatment were explained in detail. The patient declined a personal or family history of neuromuscular disease prior to the procedure. The patient is not pregnant or breast feeding.   A signed informed operative consent was obtained.    The patient was taken to the operative suite and properly positioned. The area to be treated was defined and confirmed by the patient and physician. The area for Botox injection was marked.    Cosmetic procedure: A total of 34 Units were injected into sites at the forehead and glabella. The patient tolerated the procedure well and there were no complications noted. Patient was given wound care instructions and will follow-up in approximately 14  days.       Clinical Follow-Up: 2 weeks    Additional History: Facial rash from previous viisit has resolved    Staff Involved:  Scribe/Staff    Scribe Disclosure:   I, Rony Campos, am serving as a scribe to document services personally performed by this physician, Dr. Gilda Hernandez, based on data collection and the provider's statements to me.     Provider Disclosure:   The documentation recorded by the scribe accurately reflects the services I personally performed and the decisions made by me.    Gilda Hernandez MD    Department of Dermatology  [facility] Phone: [phone], Fax:[phone]  [facility]: Phone: [phone], Fax: [phone]

## 2021-11-19 NOTE — NURSING NOTE
Marium Moss's goals for this visit include:   Chief Complaint   Patient presents with     Botox     She requests these members of her care team be copied on today's visit information:     PCP: Clinic, [facility] [place]    Referring Provider:  No referring provider defined for this encounter.    There were no vitals taken for this visit.    Do you need any medication refills at today's visit? No  Zahra Hebert, CMA on 11/19/2021 at 8:38 AM

## 2022-01-05 ENCOUNTER — OFFICE VISIT (OUTPATIENT)
Dept: DERMATOLOGY | Facility: CLINIC | Age: 42
End: 2022-01-05
Payer: COMMERCIAL

## 2022-01-05 DIAGNOSIS — L81.1 MELASMA: ICD-10-CM

## 2022-01-05 DIAGNOSIS — L57.8 PHOTOAGING OF SKIN: Primary | ICD-10-CM

## 2022-01-05 PROCEDURE — 99214 OFFICE O/P EST MOD 30 MIN: CPT | Performed by: DERMATOLOGY

## 2022-01-05 NOTE — LETTER
1/5/2022       RE: Marium Moss  [address] [address]     Dear Colleague,    Thank you for referring your patient, Marium Moss, to the [facility]. Please see a copy of my visit note below.    [facility] Dermatology Note  Encounter Date: Jan 5, 2022  Office Visit     Dermatology Problem List:  1. Solar lentigines, seborrheic keratoses on face  2. Banal-appearing melanocytic nevi on the trunk  3. Melasma   - Cyspera started 1/5/22   - hydroquinone 8% started 6/18/21  -hydroquinone 4% cream - past tx  -Azelaic acid  -Chemical peels should be considered if not improved.     ____________________________________________    Assessment & Plan:    # Melasma and solar lentigines, Will take break from use. Still bothered by the appearance   - Discontinue the hydroquinone  - Start Cyspera once daily for 4 months. Apply a thin layer and leave on for 15 minutes, then wash face with gentle soap. After, moisturize face with gentle cream. Reviewed Cyspera instructions. Hold if irritating. Pt not preg or BF.     I called keyannan 1/7/2021 to confirm that she will stop the drug if she becomes pregnant but no answer. I will send a mychart.     Procedures Performed:   None    Follow-up: 3 month(s) in-person for Botox and recheck on Melasma    Staff and Scribe:     Scribe Disclosure:  I, Maira Griffin, am serving as a scribe to document services personally performed by Gilda Hernandez MD based on data collection and the provider's statements to me.     Provider Disclosure:   The documentation recorded by the scribe accurately reflects the services I personally performed and the decisions made by me.    Gilda Hernandez MD    Department of Dermatology  [facility]: Phone: [phone], Fax:[phone]  [facility]  [facility]: Phone: [phone], Fax: [phone]      ____________________________________________    CC: Derm Problem (Marium is here today for melasma follow up)    HPI:  Ms. Marium Moss is a(n) 41 year old female who presents today as a return patient for follow-up. The patient was last seen in dermatology by myself on 11/19/21 at which point she received botox for treatment of rhytides.     Today, the patient reports that she had to discontinue the hydroquinone due to irritation. She uses [employer] moisturizer and CounterStart Coconut wash. Patient is otherwise feeling well, without additional skin concerns.    Not preg or BF    Labs Reviewed:  N/A    Physical Exam:  Vitals: There were no vitals taken for this visit.  SKIN: Focused examination of the face was performed.  - Solar lentigines and hyperpigmented patches on cheeks  - No other lesions of concern on areas examined.     Medications:  Current Outpatient Medications   Medication     CLONAZEPAM PO     COMPOUNDED NON-CONTROLLED SUBSTANCE (CMPD RX) - PHARMACY TO MIX COMPOUNDED MEDICATION     hydrocortisone 2.5 % cream     predniSONE (DELTASONE) 10 MG tablet     Current Facility-Administered Medications   Medication     Botulinum Toxin Type A (Cosm) (BOTOX-COSMETIC) 50 units injection 34 Units      Past Medical History:   Patient Active Problem List   Diagnosis     CARDIOVASCULAR SCREENING; LDL GOAL LESS THAN 160     Bipolar disorder (manic depression) (H)     Past Medical History:   Diagnosis Date     Bipolar affective disorder (H)      Depressive disorder, not elsewhere classified     sees therapist - Mary Ceja for med mgmt     Skin disease         CC [facility] [place] [facility]  [address] [place] [address] on close of this encounter.

## 2022-01-05 NOTE — PROGRESS NOTES
[facility] Dermatology Note  Encounter Date: Jan 5, 2022  Office Visit     Dermatology Problem List:  1. Solar lentigines, seborrheic keratoses on face  2. Banal-appearing melanocytic nevi on the trunk  3. Melasma   - Cyspera started 1/5/22   - hydroquinone 8% started 6/18/21  -hydroquinone 4% cream - past tx  -Azelaic acid  -Chemical peels should be considered if not improved.     ____________________________________________    Assessment & Plan:    # Melasma and solar lentigines, Will take break from use. Still bothered by the appearance   - Discontinue the hydroquinone  - Start Cyspera once daily for 4 months. Apply a thin layer and leave on for 15 minutes, then wash face with gentle soap. After, moisturize face with gentle cream. Reviewed Cyspera instructions. Hold if irritating. Pt not preg or BF.     I called keyannaclaus 1/7/2021 to confirm that she will stop the drug if she becomes pregnant but no answer. I will send a mychart.     Procedures Performed:   None    Follow-up: 3 month(s) in-person for Botox and recheck on Melasma    Staff and Scribe:     Scribe Disclosure:  I, Maira Griffin, am serving as a scribe to document services personally performed by Gilda Hernandez MD based on data collection and the provider's statements to me.     Provider Disclosure:   The documentation recorded by the scribe accurately reflects the services I personally performed and the decisions made by me.    Gilda Hernandez MD    Department of Dermatology  [facility]: Phone: [phone], Fax:[phone]  [facility]: Phone: [phone], Fax: [phone]      ____________________________________________    CC: Derm Problem (Marium is here today for melasma follow up)    HPI:  Ms. Marium Moss is a(n) 41 year old female who presents today as a return patient for follow-up. The patient was last  seen in dermatology by myself on 11/19/21 at which point she received botox for treatment of rhytides.     Today, the patient reports that she had to discontinue the hydroquinone due to irritation. She uses [employer] moisturizer and CounterStart Coconut wash. Patient is otherwise feeling well, without additional skin concerns.    Not preg or BF    Labs Reviewed:  N/A    Physical Exam:  Vitals: There were no vitals taken for this visit.  SKIN: Focused examination of the face was performed.  - Solar lentigines and hyperpigmented patches on cheeks  - No other lesions of concern on areas examined.     Medications:  Current Outpatient Medications   Medication     CLONAZEPAM PO     COMPOUNDED NON-CONTROLLED SUBSTANCE (CMPD RX) - PHARMACY TO MIX COMPOUNDED MEDICATION     hydrocortisone 2.5 % cream     predniSONE (DELTASONE) 10 MG tablet     Current Facility-Administered Medications   Medication     Botulinum Toxin Type A (Cosm) (BOTOX-COSMETIC) 50 units injection 34 Units      Past Medical History:   Patient Active Problem List   Diagnosis     CARDIOVASCULAR SCREENING; LDL GOAL LESS THAN 160     Bipolar disorder (manic depression) (H)     Past Medical History:   Diagnosis Date     Bipolar affective disorder (H)      Depressive disorder, not elsewhere classified     sees therapist - Mary Ceja for med mgmt     Skin disease         CC [facility] [place] [facility]  [address] [place] [address] on close of this encounter.

## 2022-01-07 ENCOUNTER — MYC MEDICAL ADVICE (OUTPATIENT)
Dept: DERMATOLOGY | Facility: CLINIC | Age: 42
End: 2022-01-07
Payer: COMMERCIAL

## 2022-01-15 ENCOUNTER — HEALTH MAINTENANCE LETTER (OUTPATIENT)
Age: 42
End: 2022-01-15

## 2022-03-09 NOTE — PROGRESS NOTES
[facility] Dermatology Note  Encounter Date: Mar 11, 2022  Office Visit     Dermatology Problem List:  1. Solar lentigines, seborrheic keratoses on face  2. Banal-appearing melanocytic nevi on the trunk  3. Melasma   - Cyspera started 1/5/22   - hydroquinone 8% started 6/18/21, started again.   -hydroquinone 4% cream - past tx  -Azelaic acid  -Chemical peels should be considered if not improved.     ____________________________________________    Assessment & Plan:    # Melasma and solar lentigines.  Flaring off hydroquinone and no use of cyspera   - Restart the hydroquinone 8%/tretnoin/fluocinolone. Refilled. I reviewed to hold if irritating and do 1-2 application so hydrocortisone.   - Pt will hold cyspera. She is unable to do the mask due to smell and time right now. She will consider for future  Procedures Performed:   None    Follow-up: 3 months photos and phone and 5 months botox    Staff and Scribe:     Scribe Disclosure:   I, Warner Cason, am serving as a scribe to document services personally performed by this physician, Dr. Gilda Hernandez, based on data collection and the provider's statements to me.       Provider Disclosure:   The documentation recorded by the scribe accurately reflects the services I personally performed and the decisions made by me.    Gilda Hernandez MD    Department of Dermatology  [facility]: Phone: [phone], Fax:[phone]  [facility]: Phone: [phone], Fax: [phone]      ____________________________________________    CC: Derm Problem (melasma- wanting to go back to 8% hydroquinone compound- does not like cyspera)    HPI:  Ms. Marium Moss is a(n) 41 year old female who presents today as a return patient for melasma.     Last seen on 1/5/22 for melasma. At that time, patient to start Cyspera daily x4 months. Hydroquinone was  discontinued.     Today, Patient notes she is not consistant with the cyspera and would like to go back to hydroquinone. But she notes her face peels with the cream. No pregnancy. Not breastfeeding.     Patient is otherwise feeling well, without additional skin concerns.    Labs Reviewed:  N/A    Physical Exam:  Vitals: There were no vitals taken for this visit.  SKIN: Focused examination of the face was performed.  - rhytides forehead and glabella  Hyperpigmented macules cheeks and forehead  - No other lesions of concern on areas examined.     Medications:  Current Outpatient Medications   Medication     CLONAZEPAM PO     COMPOUNDED NON-CONTROLLED SUBSTANCE (CMPD RX) - PHARMACY TO MIX COMPOUNDED MEDICATION     hydrocortisone 2.5 % cream     Current Facility-Administered Medications   Medication     Botulinum Toxin Type A (Cosm) (BOTOX-COSMETIC) 50 units injection 34 Units      Past Medical History:   Patient Active Problem List   Diagnosis     CARDIOVASCULAR SCREENING; LDL GOAL LESS THAN 160     Bipolar disorder (manic depression) (H)     Past Medical History:   Diagnosis Date     Bipolar affective disorder (H)      Depressive disorder, not elsewhere classified     sees therapist - Mary Ceja for med mgmt     Skin disease         CC No referring provider defined for this encounter. on close of this encounter.

## 2022-03-09 NOTE — PROGRESS NOTES
Botulinum Injection Procedure Note:  Cosmetic      Dermatology Problem List:  1. Solar lentigines, seborrheic keratoses on face  2. Banal-appearing melanocytic nevi on the trunk  3. Melasma   - Cyspera started 1/5/22   - hydroquinone 8% started 6/18/21  -hydroquinone 4% cream - past tx  -Azelaic acid  -Chemical peels should be considered if not improved    ATTENDING STAFF SURGEON: Dr. Gilda Hernandez    RESIDENT SURGEON: Светлана    NURSE: Angie Encarnacion RN     ANESTHESIA:   None    PREOPERATIVE DIAGNOSIS:   Rhytides    LOCATION: Forehead and glabella    LOT NO: [id number]    EXP DATE:05/2024    NDC number : 1863-9601-25    OPERATION/PROCEDURE:   Intralesional botulinum toxin injection     Dilution with 0.6 ml preserved sterile normal saline in a 50 Unit Botox Vial.    Total units of botulinum toxin: 40 units    POSTOPERATIVE DIAGNOSIS:   SAME     PREPARATION:   Alcohol swab    DESCRIPTION OF OPERATION/PROCEDURE:   The nature and purpose of the procedure, associated risks including but not limited to bruising, headache or discomfort at the site(s),     brow or eyelid droop, headache, double vision, not enough effect or too much effect. Possible consequences and complications, and alternative methods of treatment were explained in detail. The patient declined a personal or family history of neuromuscular disease prior to the procedure.  A signed informed operative consent was obtained.    The patient was taken to the operative suite and properly positioned. The area to be treated was defined and confirmed by the patient and physician. The area for Botox injection was marked.    Cosmetic procedure: A total of 40 Units were injected into sites at the forehead and glabella. The patient tolerated the procedure well and there were no complications noted. Patient was given wound care instructions and will follow-up in approximately 3 months phone call.       Clinical Follow-Up: 6 months    Staff Involved:  Scribe/Staff    Scribe  Disclosure:   I, Warner Cason, am serving as a scribe to document services personally performed by this physician, Dr. Gilda Hernandez, based on data collection and the provider's statements to me.     Provider Disclosure:   The documentation recorded by the scribe accurately reflects the services I personally performed and the decisions made by me.    Gilda Hernandez MD    Department of Dermatology  [facility]: Phone: [phone], Fax:[phone]  [facility]: Phone: [phone], Fax: [phone]

## 2022-03-11 ENCOUNTER — OFFICE VISIT (OUTPATIENT)
Dept: DERMATOLOGY | Facility: CLINIC | Age: 42
End: 2022-03-11
Payer: COMMERCIAL

## 2022-03-11 DIAGNOSIS — L81.1 MELASMA: Primary | ICD-10-CM

## 2022-03-11 DIAGNOSIS — L98.8 RHYTIDES: ICD-10-CM

## 2022-03-11 DIAGNOSIS — L81.4 SOLAR LENTIGINOSIS: Primary | ICD-10-CM

## 2022-03-11 PROCEDURE — 96999 UNLISTED SPEC DERM SVC/PX: CPT | Performed by: DERMATOLOGY

## 2022-03-11 PROCEDURE — 99213 OFFICE O/P EST LOW 20 MIN: CPT | Performed by: DERMATOLOGY

## 2022-03-11 NOTE — LETTER
3/11/2022         RE: Marium Moss  [address] [address]        Dear Colleague,    Thank you for referring your patient, Marium Moss, to the [facility]. Please see a copy of my visit note below.    [facility] Dermatology Note  Encounter Date: Mar 11, 2022  Office Visit     Dermatology Problem List:  1. Solar lentigines, seborrheic keratoses on face  2. Banal-appearing melanocytic nevi on the trunk  3. Melasma   - Cyspera started 1/5/22   - hydroquinone 8% started 6/18/21, started again.   -hydroquinone 4% cream - past tx  -Azelaic acid  -Chemical peels should be considered if not improved.     ____________________________________________    Assessment & Plan:    # Melasma and solar lentigines.  Flaring off hydroquinone and no use of cyspera   - Restart the hydroquinone 8%/tretnoin/fluocinolone. Refilled. I reviewed to hold if irritating and do 1-2 application so hydrocortisone.   - Pt will hold cyspera. She is unable to do the mask due to smell and time right now. She will consider for future  Procedures Performed:   None    Follow-up: 3 months photos and phone and 5 months botox    Staff and Scribe:     Scribe Disclosure:   I, Warner Cason, am serving as a scribe to document services personally performed by this physician, Dr. Gilda Hernandez, based on data collection and the provider's statements to me.       Provider Disclosure:   The documentation recorded by the scribe accurately reflects the services I personally performed and the decisions made by me.    Gilda Hernandez MD    Department of Dermatology  [facility]: Phone: [phone], Fax:[phone]  [facility]: Phone: [phone], Fax: [phone]      ____________________________________________    CC: Derm Problem (melasma- wanting to go back to 8% hydroquinone  compound- does not like cyspera)    HPI:  Ms. Marium Moss is a(n) 41 year old female who presents today as a return patient for melasma.     Last seen on 1/5/22 for melasma. At that time, patient to start Cyspera daily x4 months. Hydroquinone was discontinued.     Today, Patient notes she is not consistant with the cyspera and would like to go back to hydroquinone. But she notes her face peels with the cream. No pregnancy. Not breastfeeding.     Patient is otherwise feeling well, without additional skin concerns.    Labs Reviewed:  N/A    Physical Exam:  Vitals: There were no vitals taken for this visit.  SKIN: Focused examination of the face was performed.  - rhytides forehead and glabella  Hyperpigmented macules cheeks and forehead  - No other lesions of concern on areas examined.     Medications:  Current Outpatient Medications   Medication     CLONAZEPAM PO     COMPOUNDED NON-CONTROLLED SUBSTANCE (CMPD RX) - PHARMACY TO MIX COMPOUNDED MEDICATION     hydrocortisone 2.5 % cream     Current Facility-Administered Medications   Medication     Botulinum Toxin Type A (Cosm) (BOTOX-COSMETIC) 50 units injection 34 Units      Past Medical History:   Patient Active Problem List   Diagnosis     CARDIOVASCULAR SCREENING; LDL GOAL LESS THAN 160     Bipolar disorder (manic depression) (H)     Past Medical History:   Diagnosis Date     Bipolar affective disorder (H)      Depressive disorder, not elsewhere classified     sees therapist - Mary Ceja for med mgmt     Skin disease         CC No referring provider defined for this encounter. on close of this encounter.      Again, thank you for allowing me to participate in the care of your patient.        Sincerely,        Gilda Hernandez MD

## 2022-03-11 NOTE — LETTER
3/11/2022         RE: Marium Moss  [address] [address]        Dear Colleague,    Thank you for referring your patient, Marium Moss, to the [facility]. Please see a copy of my visit note below.    Botulinum Injection Procedure Note:  Cosmetic      Dermatology Problem List:  1. Solar lentigines, seborrheic keratoses on face  2. Banal-appearing melanocytic nevi on the trunk  3. Melasma   - Cyspera started 1/5/22   - hydroquinone 8% started 6/18/21  -hydroquinone 4% cream - past tx  -Azelaic acid  -Chemical peels should be considered if not improved    ATTENDING STAFF SURGEON: Dr. Gilda Hernandez    RESIDENT SURGEON: Светлана    NURSE: Angie Encarnacion RN     ANESTHESIA:   None    PREOPERATIVE DIAGNOSIS:   Rhytides    LOCATION: Forehead and glabella    LOT NO: [id number]    EXP DATE:05/2024    NDC number : 7078-6329-03    OPERATION/PROCEDURE:   Intralesional botulinum toxin injection     Dilution with 0.6 ml preserved sterile normal saline in a 50 Unit Botox Vial.    Total units of botulinum toxin: 40 units    POSTOPERATIVE DIAGNOSIS:   SAME     PREPARATION:   Alcohol swab    DESCRIPTION OF OPERATION/PROCEDURE:   The nature and purpose of the procedure, associated risks including but not limited to bruising, headache or discomfort at the site(s),     brow or eyelid droop, headache, double vision, not enough effect or too much effect. Possible consequences and complications, and alternative methods of treatment were explained in detail. The patient declined a personal or family history of neuromuscular disease prior to the procedure.  A signed informed operative consent was obtained.    The patient was taken to the operative suite and properly positioned. The area to be treated was defined and confirmed by the patient and physician. The area for Botox injection was marked.    Cosmetic procedure: A total of 40 Units were injected into sites at the forehead and glabella. The patient  tolerated the procedure well and there were no complications noted. Patient was given wound care instructions and will follow-up in approximately 3 months phone call.       Clinical Follow-Up: 6 months    Staff Involved:  Scribe/Staff    Scribe Disclosure:   I, Warner Cason, am serving as a scribe to document services personally performed by this physician, Dr. Gilda Hernandez, based on data collection and the provider's statements to me.     Provider Disclosure:   The documentation recorded by the scribe accurately reflects the services I personally performed and the decisions made by me.    Gilda Hernandez MD    Department of Dermatology  [facility]: Phone: [phone], Fax:[phone]  [facility]: Phone: [phone], Fax: [phone]            Again, thank you for allowing me to participate in the care of your patient.        Sincerely,        Gilda Hernandez MD

## 2022-03-11 NOTE — NURSING NOTE
Marium Moss's goals for this visit include:   Chief Complaint   Patient presents with     Derm Problem     melasma- wanting to go back to 8% hydroquinone compound- does not like cyspera     She requests these members of her care team be copied on today's visit information:     PCP: Clinic, [facility]    Referring Provider:  No referring provider defined for this encounter.    There were no vitals taken for this visit.    Do you need any medication refills at today's visit? No    Zahra Hebert, PAULIE on 3/11/2022 at 2:36 PM

## 2022-06-13 ENCOUNTER — VIRTUAL VISIT (OUTPATIENT)
Dept: DERMATOLOGY | Facility: CLINIC | Age: 42
End: 2022-06-13
Payer: COMMERCIAL

## 2022-06-13 DIAGNOSIS — L81.1 MELASMA: ICD-10-CM

## 2022-06-13 DIAGNOSIS — L81.4 SOLAR LENTIGINOSIS: Primary | ICD-10-CM

## 2022-06-13 PROBLEM — Z98.890 S/P HEMORRHOIDECTOMY: Status: ACTIVE | Noted: 2021-02-18

## 2022-06-13 PROBLEM — R25.1 TREMOR OF LEFT HAND: Status: ACTIVE | Noted: 2019-12-16

## 2022-06-13 PROBLEM — R51.9 CHRONIC HEADACHES: Status: ACTIVE | Noted: 2019-12-10

## 2022-06-13 PROBLEM — K64.4 EXTERNAL HEMORRHOID, BLEEDING: Status: ACTIVE | Noted: 2020-07-20

## 2022-06-13 PROBLEM — F31.4 BIPOLAR I DISORDER, MOST RECENT EPISODE DEPRESSED, SEVERE WITHOUT PSYCHOTIC FEATURES (H): Status: ACTIVE | Noted: 2020-03-31

## 2022-06-13 PROBLEM — G89.29 CHRONIC HEADACHES: Status: ACTIVE | Noted: 2019-12-10

## 2022-06-13 PROBLEM — R87.611 PAP SMEAR OF CERVIX WITH ASCUS, CANNOT EXCLUDE HGSIL: Status: ACTIVE | Noted: 2022-02-18

## 2022-06-13 PROBLEM — Z87.19 S/P HEMORRHOIDECTOMY: Status: ACTIVE | Noted: 2021-02-18

## 2022-06-13 PROBLEM — Z30.09 FAMILY PLANNING EDUCATION, GUIDANCE, AND COUNSELING: Status: ACTIVE | Noted: 2020-07-10

## 2022-06-13 PROCEDURE — 99214 OFFICE O/P EST MOD 30 MIN: CPT | Mod: 95 | Performed by: DERMATOLOGY

## 2022-06-13 RX ORDER — LAMOTRIGINE 150 MG/1
150 TABLET ORAL DAILY
COMMUNITY
Start: 2022-06-13

## 2022-06-13 NOTE — PATIENT INSTRUCTIONS
"[facility] Dermatology Visit    Thank you for allowing us to participate in your care. Your findings, instructions and follow-up plan are as follows:     Tranexamic acid: Patient drug information      Access [employer] here.  Copyright 2089-1874 [employer] All rights reserved.  (For additional information see \"Tranexamic acid: Drug information\" and see \"Tranexamic acid: Pediatric drug information\")    Brand Names: US  Cyklokapron; Lysteda  Brand Names: Stacy  Cyklokapron; Erfa-Tranexamic; GD-Tranexamic Acid; MAR-Tranexamic Acid    What is this drug used for?   It is used to treat or prevent bleeding in patients with hemophilia after having a tooth removed.    It is used to treat heavy bleeding during monthly periods (menstruation).   It may be given to you for other reasons. Talk with the doctor.  What do I need to tell my doctor BEFORE I take this drug?   If you are allergic to this drug; any part of this drug; or any other drugs, foods, or substances. Tell your doctor about the allergy and what signs you had.   If you have a blood clot, have ever had a blood clot, or have been told you are at risk of getting a blood clot.   If you have any of these health problems: Bleeding in the brain or trouble seeing some colors.   If you are taking any of these drugs: Factor IX complex or anti-inhibitor coagulant complex.   If you are using a hormone-based birth control.  This is not a list of all drugs or health problems that interact with this drug.  Tell your doctor and pharmacist about all of your drugs (prescription or OTC, natural products, vitamins) and health problems. You must check to make sure that it is safe for you to take this drug with all of your drugs and health problems. Do not start, stop, or change the dose of any drug without checking with your doctor.    What are some things I need to know or do while I take this drug?  All products:   Tell all of your health care providers " that you take this drug. This includes your doctors, nurses, pharmacists, and dentists.    Avoid driving and doing other tasks or actions that call for you to be alert until you see how this drug affects you.   Blood clots have happened with this drug. Tell your doctor if you have ever had a blood clot. Talk with your doctor.   Have an eye exam as you have been told by your doctor.   Tell your doctor if you are pregnant, plan on getting pregnant, or are breast-feeding. You will need to talk about the benefits and risks to you and the baby.  Tablets:   If you still have heavy periods (menstrual bleeding) after 2 cycles or if this drug stops working, talk with your doctor.    What are some side effects that I need to call my doctor about right away?  WARNING/CAUTION: Even though it may be rare, some people may have very bad and sometimes deadly side effects when taking a drug. Tell your doctor or get medical help right away if you have any of the following signs or symptoms that may be related to a very bad side effect:  All products:   Signs of an allergic reaction, like rash; hives; itching; red, swollen, blistered, or peeling skin with or without fever; wheezing; tightness in the chest or throat; trouble breathing, swallowing, or talking; unusual hoarseness; or swelling of the mouth, face, lips, tongue, or throat.   Flushing.   Weakness on 1 side of the body, trouble speaking or thinking, change in balance, drooping on one side of the face, or blurred eyesight.   Very bad headache.   Change in eyesight, eye pain, or very bad eye irritation.   Feeling very tired or weak.   Seizures.   Call your doctor right away if you have signs of a blood clot like chest pain or pressure; coughing up blood; shortness of breath; swelling, warmth, numbness, change of color, or pain in a leg or arm; or trouble speaking or swallowing.  Injection:   Very bad dizziness or passing out.  What are some other side effects of this drug?  All  drugs may cause side effects. However, many people have no side effects or only have minor side effects. Call your doctor or get medical help if any of these side effects or any other side effects bother you or do not go away:  Tablets:   Headache.   Stomach pain.   Back, muscle, or joint pain.   Nose stuffiness.   Muscle cramps.   Feeling tired or weak.  Injection:   Diarrhea, upset stomach, or throwing up.   Dizziness.  These are not all of the side effects that may occur. If you have questions about side effects, call your doctor. Call your doctor for medical advice about side effects.  You may report side effects to your national health agency.  How is this drug best taken?  Use this drug as ordered by your doctor. Read all information given to you. Follow all instructions closely.  Tablets:   Take with or without food.   Swallow whole. Do not chew, break, or crush.   Do not take this drug for longer than you were told by your doctor.   Do not take if you do not have your period.  Injection:   It is given into a vein for a period of time.  What do I do if I miss a dose?  Tablets:   Take a missed dose as soon as you think about it. Then take your next dose at least 6 hours later.   If it is close to the time for your next dose, skip the missed dose and go back to your normal time.   Do not take 2 doses at the same time or extra doses.  Injection:   Call your doctor to find out what to do.  How do I store and/or throw out this drug?  Tablets:   Store at room temperature in a dry place. Do not store in a bathroom.  Injection:   If you need to store this drug at home, talk with your doctor, nurse, or pharmacist about how to store it.  All products:   Keep all drugs in a safe place. Keep all drugs out of the reach of children and pets.   Throw away unused or  drugs. Do not flush down a toilet or pour down a drain unless you are told to do so. Check with your pharmacist if you have questions about the best way  to throw out drugs. There may be drug take-back programs in your area.  General drug facts   If your symptoms or health problems do not get better or if they become worse, call your doctor.    Do not share your drugs with others and do not take anyone else's drugs.   Some drugs may have another patient information leaflet. If you have any questions about this drug, please talk with your doctor, nurse, pharmacist, or other health care provider.   If you think there has been an overdose, call your poison control center or get medical care right away. Be ready to tell or show what was taken, how much, and when it happened.       When should I call my doctor?  If you are worsening or not improving, please, contact us or seek urgent care as noted below.     Who should I call with questions (adults)?  [facility] (adult and pediatric): [phone]  [facility] (adult): [phone]  For urgent needs outside of business hours call the [facility] at [phone] and ask for the dermatology resident on call  If this is a medical emergency and you are unable to reach an ER, Call 911    Who should I call with questions (pediatric)?  [facility]- Pediatric Dermatology  Dr. Nel Conte, Dr. Karmen Albarran, Dr. Karen Rosas, Isabelle Alcaraz, MARLEE Nathan, Dr. Nella Lamb & Dr. Edvin Vang  Non Urgent  Nurse Triage Line; [phone]- Natalie and Nyla TELLO Care Coordinators   Marianne (/Complex ) [phone]    If you need a prescription refill, please contact your pharmacy. Refills are approved or denied by our physicians during normal business hours, Monday through Fridays  Per office policy, refills will not be granted if you have not been seen within the past year (or sooner depending on your child's condition).    Scheduling Information:  Pediatric Appointment Scheduling and Call Center  [phone]  Radiology Scheduling- [phone]  Sedation Unit Scheduling- [phone]  [place] Scheduling- General [phone]; Pediatric Dermatology [phone]  Main  Services: [phone]  Swedish: [phone]  Afghan: [phone]  Hmong/Gilberto/Kinyarwanda: [phone]  Preadmission Nursing Department Fax Number: [phone] (fax all pre-operative paperwork to this number)    For urgent matters arising during evenings, weekends, or holidays that cannot wait for normal business hours please call [phone] and ask for the dermatology resident on call to be paged.       Chemical Peel Preparation    Pre-procedure:  Reveal all medical or skin conditions, especially cold sores, use of prescriptions medications, recent peels or other facial procedure, etc. to help determine procedure plan  Arrive with a fully cleansed face, free of makeup  It is recommended that men not shave within a few hours prior to the treatment  Plan for photos to track progress and in case of side effects    Stop ALL of the following 7 days pre-procedure:  Topical vitamin A products such as retinols or retinoids (Differin, adapalene, Retin-A, tretinoin)  Products with glycolic acid, salicylic acid, Vitamin A, hydroquinone  Depilatory use/waxing  Chemical peel within the 8 weeks depending on peel depth  Excessively drying or irritating products  Electrolysis  Hair chemicals    2 weeks pre-procedure:  Cannot have Botox of fillers    4-6 weeks pre-procedure:  Free of cystic acne or herpes outbreak  If using Efudex treatment for pre-cancer, treatment needs to be completed    Who should I call with questions?  [facility]: [phone]  [facility]: [phone]  For urgent needs outside of business hours call the [facility] at [phone] and ask for the dermatology [id number]

## 2022-06-13 NOTE — PROGRESS NOTES
[facility] Dermatology Note  Encounter Date: Jun 13, 2022  Store-and-Forward and Telephone ([phone] ). Location of teledermatologist: [facility].  Start time: 5:32on. End time: 5:59pm.    Dermatology Problem List:  1. Solar lentigines, seborrheic keratoses on face  2. Banal-appearing melanocytic nevi on the trunk  3. Melasma   - Cyspera started 1/5/22 and not helpful   - hydroquinone 8% started 6/18/21, started again.   -hydroquinone 4% cream - past tx  -Azelaic acid  -Chemical peels should be considered if not improved.  ]    Botox and filler   ____________________________________________     Assessment & Plan:     # Melasma and solar lentigines.  Flaring off hydroquinone and no use of cyspera   - Restart the hydroquinone 8%/tretnoin/fluocinolone use for 4 months, then stop  - Pt go ahead and stop cyspera   For skin peels on the face: Discussed risk of peels including but not limited to erythema, peeling, redness, blisters, reactivation of HSV, rare risk of scarring, and hypo and hyperpigmentation. Peel handout provided. Patient will likely require 3-6 peels, approximately 4-6 weeks apart for improvement. Patient will be using skin medical  peel. Patient will discontinue differin/retinoinds/  week prior and 1 week after. Riojas skin type III. Denies allergies to strawberries/aspirin/sulfa.  Has not been in Accutane in the last 6 months.  Not currently pregnant or breastfeeding. Patient has not had other skin procedures in the last week and was counseled to avoid hair removal procedures including lasers, depilatory cream, waxing and electrolysis the week prior.  Consent obtained in clinic today and pre-peel instructions provided in written format.   -future tranexamic acid      Tranexamic acid review    No personal of first degree family history of clotting disorders, bleeding disorders, MI, stroke. Patient understands these are all risks.  Pt doesn't smoke and  not on hormonal therapy. *No medication interactions.     Patient understands the use of this drug is off label.     Not pregnant or breastfeeding, no hx of malignancy. The patient has had a bilateral salpinectomy. Reviewed with her and checked GYN op record.     Medication interactions reviewed for any issues    Reviewed  to stop medication and call for any of the following : siezure, changes in vision, calf tenderness, SOB, Stomach pain, Back, muscle, or joint pain, Nose stuffiness, Muscle cramps, Feeling tired or weak.    Counseled patient to read patient handout.   Screening for protein C and S def, PT/ptt, factor V lieden would need to be obtained  Lamictal is on her drug list and we would run by pharmacy      #Rhytides and facial volume loss  -2 syringes or lyft of voluma, either  Procedures Performed:    None    Follow-up: at [place] for filler visit in Sept, 2 sryinges of resytlane lyft. Schedule for fall chemical peels startin in october  Staff:     Gilda Hernandez MD    Department of Dermatology  [facility]: Phone: [phone], Fax:[phone]  [facility]: Phone: [phone], Fax: [phone]      ____________________________________________    CC: Follow Up (Marium, is here for a follow up and she states she had seen no change with using the mask )    HPI:  Ms. Marium Moss is a(n) 41 year old female who presents today as a return patient for melasma. She feels she is not better with cyspera    Wants filler    Has not started tri-nakita    Patient is otherwise feeling well, without additional skin concerns.    Labs Reviewed:  NA    Physical Exam:  Vitals: There were no vitals taken for this visit.  SKIN: Teledermatology photos were reviewed; image quality and interpretability:  acceptable. Image date: today.  - tan macules and patches on the face  - No other lesions of concern  on areas examined.     Medications:  Current Outpatient Medications   Medication     CLONAZEPAM PO     COMPOUNDED NON-CONTROLLED SUBSTANCE (CMPD RX) - PHARMACY TO MIX COMPOUNDED MEDICATION     hydrocortisone 2.5 % cream     Current Facility-Administered Medications   Medication     Botulinum Toxin Type A (Cosm) (BOTOX-COSMETIC) 50 units injection 34 Units     Botulinum Toxin Type A (Cosm) (BOTOX-COSMETIC) 50 units injection 40 Units      Past Medical/Surgical History:   Patient Active Problem List   Diagnosis     CARDIOVASCULAR SCREENING; LDL GOAL LESS THAN 160     Bipolar disorder (manic depression) (H)     Past Medical History:   Diagnosis Date     Bipolar affective disorder (H)      Depressive disorder, not elsewhere classified     sees therapist - Mary Ceja for med mgmt     Skin disease        CC No referring provider defined for this encounter. on close of this encounter.

## 2022-06-13 NOTE — NURSING NOTE
Chief Complaint   Patient presents with     Follow Up     Marium, is here for a follow up and she states she had seen no change with using the mask     Teledermatology Nurse Call Patients:     Are you in the state [facility] at the time of the encounter? yes    Today's visit will be billed to you and your insurance.    A teledermatology visit is not as thorough as an in-person visit and the quality of the photograph sent may not be of the same quality as that taken by the dermatology clinic.  Ghanshyam Lennon VF

## 2022-06-13 NOTE — LETTER
6/13/2022         RE: Marium Moss  [address] [address]        Dear Colleague,    Thank you for referring your patient, Marium Moss, to the [facility]. Please see a copy of my visit note below.    [facility] Dermatology Note  Encounter Date: Jun 13, 2022  Store-and-Forward and Telephone ([phone] ). Location of teledermatologist: [facility].  Start time: 5:32on. End time: 5:59pm.    Dermatology Problem List:  1. Solar lentigines, seborrheic keratoses on face  2. Banal-appearing melanocytic nevi on the trunk  3. Melasma   - Cyspera started 1/5/22 and not helpful   - hydroquinone 8% started 6/18/21, started again.   -hydroquinone 4% cream - past tx  -Azelaic acid  -Chemical peels should be considered if not improved.  ]    Botox and filler   ____________________________________________     Assessment & Plan:     # Melasma and solar lentigines.  Flaring off hydroquinone and no use of cyspera   - Restart the hydroquinone 8%/tretnoin/fluocinolone use for 4 months, then stop  - Pt go ahead and stop cyspera   For skin peels on the face: Discussed risk of peels including but not limited to erythema, peeling, redness, blisters, reactivation of HSV, rare risk of scarring, and hypo and hyperpigmentation. Peel handout provided. Patient will likely require 3-6 peels, approximately 4-6 weeks apart for improvement. Patient will be using skin medical  peel. Patient will discontinue differin/retinoinds/  week prior and 1 week after. Riojas skin type III. Denies allergies to strawberries/aspirin/sulfa.  Has not been in Accutane in the last 6 months.  Not currently pregnant or breastfeeding. Patient has not had other skin procedures in the last week and was counseled to avoid hair removal procedures including lasers, depilatory cream, waxing and electrolysis the week prior.  Consent obtained in clinic today and pre-peel  instructions provided in written format.   -future tranexamic acid      Tranexamic acid review    No personal of first degree family history of clotting disorders, bleeding disorders, MI, stroke. Patient understands these are all risks.  Pt doesn't smoke and not on hormonal therapy. *No medication interactions.     Patient understands the use of this drug is off label.     Not pregnant or breastfeeding, no hx of malignancy. The patient has had a bilateral salpinectomy. Reviewed with her and checked GYN op record.     Medication interactions reviewed for any issues    Reviewed  to stop medication and call for any of the following : siezure, changes in vision, calf tenderness, SOB, Stomach pain, Back, muscle, or joint pain, Nose stuffiness, Muscle cramps, Feeling tired or weak.    Counseled patient to read patient handout.   Screening for protein C and S def, PT/ptt, factor V lieden would need to be obtained  Lamictal is on her drug list and we would run by pharmacy      #Rhytides and facial volume loss  -2 syringes or lyft of voluma, either  Procedures Performed:    None    Follow-up: at [place] for filler visit in Sept, 2 sryinges of resytlane lyft. Schedule for fall chemical peels startin in october  Staff:     Gilda Hernandez MD    Department of Dermatology  [facility]: Phone: [phone], Fax:[phone]  [facility]: Phone: [phone], Fax: [phone]      ____________________________________________    CC: Follow Up (Marium, is here for a follow up and she states she had seen no change with using the mask )    HPI:  MsMaria Moss is a(n) 41 year old female who presents today as a return patient for melasma. She feels she is not better with cyspera    Wants filler    Has not started tri-nakita    Patient is otherwise feeling well, without additional skin concerns.    Labs  Reviewed:  NA    Physical Exam:  Vitals: There were no vitals taken for this visit.  SKIN: Teledermatology photos were reviewed; image quality and interpretability:  acceptable. Image date: today.  - tan macules and patches on the face  - No other lesions of concern on areas examined.     Medications:  Current Outpatient Medications   Medication     CLONAZEPAM PO     COMPOUNDED NON-CONTROLLED SUBSTANCE (CMPD RX) - PHARMACY TO MIX COMPOUNDED MEDICATION     hydrocortisone 2.5 % cream     Current Facility-Administered Medications   Medication     Botulinum Toxin Type A (Cosm) (BOTOX-COSMETIC) 50 units injection 34 Units     Botulinum Toxin Type A (Cosm) (BOTOX-COSMETIC) 50 units injection 40 Units      Past Medical/Surgical History:   Patient Active Problem List   Diagnosis     CARDIOVASCULAR SCREENING; LDL GOAL LESS THAN 160     Bipolar disorder (manic depression) (H)     Past Medical History:   Diagnosis Date     Bipolar affective disorder (H)      Depressive disorder, not elsewhere classified     sees therapist - Mary Ceja for med mgmt     Skin disease        CC No referring provider defined for this encounter. on close of this encounter.      Again, thank you for allowing me to participate in the care of your patient.        Sincerely,        Gilda Hernandez MD

## 2022-09-16 ENCOUNTER — OFFICE VISIT (OUTPATIENT)
Dept: DERMATOLOGY | Facility: CLINIC | Age: 42
End: 2022-09-16

## 2022-09-16 DIAGNOSIS — R23.8 FACIAL VOLUME DEPLETION: ICD-10-CM

## 2022-09-16 DIAGNOSIS — L98.8 RHYTIDES: Primary | ICD-10-CM

## 2022-09-16 PROCEDURE — 96999 UNLISTED SPEC DERM SVC/PX: CPT | Performed by: DERMATOLOGY

## 2022-09-16 NOTE — PROGRESS NOTES
Botulinum Injection Procedure Note:  Cosmetic  PROCEDURE DATE: Sep 16, 2022    Dermatology Problem List:  1. Solar lentigines, seborrheic keratoses on face  2. Banal-appearing melanocytic nevi on the trunk  3. Melasma   - Cyspera started 1/5/22   - hydroquinone 8% started 6/18/21  -hydroquinone 4% cream - past tx  -Azelaic acid  -Chemical peels should be considered if not improved    ATTENDING STAFF SURGEON: Dr. Gilda Hernandez    RESIDENT SURGEON: N/A    NURSE: Natalie Byrnes RN    ANESTHESIA:   None    PREOPERATIVE DIAGNOSIS:   Rhytides    LOCATION: Forehead and glabella    LOT NO: [id number]    EXP DATE: 2025/03    NDC number : 3191-7536-44    OPERATION/PROCEDURE:   Intralesional botulinum toxin injection     Dilution with 0.6 ml preserved sterile normal saline in a 50 Unit Botox Vial.    Total units of botulinum toxin: 40 units    POSTOPERATIVE DIAGNOSIS:   SAME     PREPARATION:   Alcohol swab    DESCRIPTION OF OPERATION/PROCEDURE:   The nature and purpose of the procedure, associated risks including but not limited to bruising, headache or discomfort at the site(s), brow or eyelid droop, headache, double vision, not enough effect or too much effect. Possible consequences and complications, and alternative methods of treatment were explained in detail. The patient declined a personal or family history of neuromuscular disease prior to the procedure.  A signed informed operative consent was obtained.    The patient was taken to the operative suite and properly positioned. The area to be treated was defined and confirmed by the patient and physician. The area for Botox injection was marked.    Cosmetic procedure: A total of 40 Units were injected into sites at the forehead and glabella. The patient tolerated the procedure well and there were no complications noted. Patient was given wound care instructions and will follow-up in approximately 3 months phone call.         Soft Tissue Augmentation Procedure Note:  Cosmetic      Dermatology Problem List:  1. Solar lentigines, seborrheic keratoses on face  2. Banal-appearing melanocytic nevi on the trunk  3. Melasma   - Cyspera started 1/5/22   - hydroquinone 8% started 6/18/21  -hydroquinone 4% cream - past tx  -Azelaic acid  -Chemical peels should be considered if not improved      Procedure Date: Sep 16, 2022     Attending Staff: Dr. Gilda Hernandez    Resident: N/A    Assistant: Natalie Byrnes RN    Diagnosis: Facial rhytides and facical volume depletion    Location: bilateral cheeks  Product: Restylane Lyft  Amount: 1.5 mL  Lot #: [id number]  Exp Date: 2025-01-31    Description of Operation/Procedure:   The nature and purpose of the procedure, associated risks, possible consequences and complications, and alternative methods of treatment were explained in detail including but not limited to bruising, pain, redness,lumps/bumps, granuloma formation, scar blindness, stroke, ulceration, ischemia, under correction, over correction, swelling, possible need for multiple treatments, infection, granuloma, pain, dyspigmentation, numbness, weakness or tingling were explained to the patient. We discussed that multiple treatments may be required. Injections on bone. The patient verbalized understanding. Photo consent and signed informed consent were obtained.Time-out was performed and patient denied history of severe allergy to bees.  Denies recent upcoming dental procedures or covid vaccine in the last 2 weeks.       The facial areas were cleansed with technicare and injections were performed as above. No complications noted. The patient tolerated the procedure well and there were no complications. Ice was provided post-procedure. The patient was provided after care instructions and will send a picture later tonight.    The patient will pay cosmetic fee today.         Clinical Follow-Up:  Phone visit. And then in person. Plan for botox in March    Staff Involved:  Scribe/Staff    Scribe  Disclosure:   I, Ricardo Garcia, am serving as a scribe to document services personally performed by this physician, Dr. Gilda Hernandez, based on data collection and the provider's statements to me.     Provider Disclosure:   The documentation recorded by the scribe accurately reflects the services I personally performed and the decisions made by me.    Gilda Hernandez MD    Department of Dermatology  [facility] [place]: Phone: [phone], Fax:[phone]  [facility]: Phone: [phone], Fax: [phone]

## 2022-09-16 NOTE — PATIENT INSTRUCTIONS
Botulinum Toxin (Botox/Dysport) Cosmetic Information    Risks: I will have pain, redness, and swelling. I may have bruising, headache or discomfort at the site(s). Risks are asymmetry, numbness, twitching, brow droop, eyelid droop, headache, double vision, not enough effect or too much effect, difficulty whistling or drinking, loss of muscle tone, headache or infection. A touch-up or multiple treatments may be required.    About Botulinum Toxin (Botox/Dysport)  You have inquired about Botox cosmetic. Botulinum toxin is a purified protein derivative developed from bacteria. It has the ability to immobilize facial muscles that create dynamic wrinkles. Dynamic wrinkles develop due to muscle contraction, and over time become permanent folds in the skin, even when the muscles are not flexed. The use of Botox results in a very pleasing cosmetic effect for many people, leading to a more youthful, relaxed appearance. Botox can be used in combination with injectable fillers, chemical peels, and laser resurfacing to treat deeper wrinkles. It also has become an accepted form of treatment for hyperhidrosis, (or excessive sweating) in people who have not responded to other therapies.     With my treatment side effects may include bruising, headache or discomfort at the site(s). Asymmetry may occur and a touch-up may be required. Risks of this procedure include numbness, muscle twitching, brow or eyelid droop, headache, double vision, not enough effect or too much effect, difficulty whistling or drinking from a straw, loss of muscle tone, headache or infection.    Post-Procedure Instructions:   Do not rub the treated area. Avoid exercise for the 24 hours following the procedure. You may move the muscles. Some people will experience bruising or eyelid ptosis (drooping) after injection. This is temporary and usually mild. Eyelid ptosis may be treated with special eye drops. Call your doctor if you have any questions or concerns  after your treatment.     Do not massage the area for 24 hours  Stay upright for 4 hours  Do not push on the injected area  Contact us if you have asymmetry at 2 weeks  Do not apply skin products to the area for 24 hours    Who should I call with questions?  [facility]: [phone]  [facility]: [phone]  For urgent needs outside of business hours call the [facility] at [phone] and ask for the resident on call  MyChart messaging may be delayed      THE ORDINARY  Azelaic Acid Suspension 10%  SIZE 1 oz/ 30 mLITEM [id number]          at [employer] and [employer]  $7.90

## 2022-09-16 NOTE — NURSING NOTE
Marium Moss's goals for this visit include:   Chief Complaint   Patient presents with     Filler     Patient reports she is feeling well today.  She is not pregnant or breast feeding.  She denies allergy to bees.     Botox       She requests these members of her care team be copied on today's visit information: n/a    PCP: Clinic, [facility]    Referring Provider:  No referring provider defined for this encounter.    There were no vitals taken for this visit.    Do you need any medication refills at today's visit? No      The following medication was applied to skin:    MEDICATION: Benzocaine 20%/Lidocaine 6%/Tetracaine 4%  ROUTE: Topical   SITE: face  DOSE: 4 grams  LOT #: [id number]  : TainaoHmarita  EXPIRATION DATE: 10/30/22  Was there drug waste? No  Multi-dose vial: Yes    Angie Encarnacion RN

## 2022-09-16 NOTE — LETTER
9/16/2022         RE: Marium Moss  [address] [address]        Dear Colleague,    Thank you for referring your patient, Marium Moss, to the [facility]. Please see a copy of my visit note below.    Botulinum Injection Procedure Note:  Cosmetic  PROCEDURE DATE: Sep 16, 2022    Dermatology Problem List:  1. Solar lentigines, seborrheic keratoses on face  2. Banal-appearing melanocytic nevi on the trunk  3. Melasma   - Cyspera started 1/5/22   - hydroquinone 8% started 6/18/21  -hydroquinone 4% cream - past tx  -Azelaic acid  -Chemical peels should be considered if not improved    ATTENDING STAFF SURGEON: Dr. Gilda Hernandez    RESIDENT SURGEON: N/A    NURSE: Natalie Byrnes RN    ANESTHESIA:   None    PREOPERATIVE DIAGNOSIS:   Rhytides    LOCATION: Forehead and glabella    LOT NO: [id number]    EXP DATE: 2025/03    NDC number : 9185-6185-02    OPERATION/PROCEDURE:   Intralesional botulinum toxin injection     Dilution with 0.6 ml preserved sterile normal saline in a 50 Unit Botox Vial.    Total units of botulinum toxin: 40 units    POSTOPERATIVE DIAGNOSIS:   SAME     PREPARATION:   Alcohol swab    DESCRIPTION OF OPERATION/PROCEDURE:   The nature and purpose of the procedure, associated risks including but not limited to bruising, headache or discomfort at the site(s), brow or eyelid droop, headache, double vision, not enough effect or too much effect. Possible consequences and complications, and alternative methods of treatment were explained in detail. The patient declined a personal or family history of neuromuscular disease prior to the procedure.  A signed informed operative consent was obtained.    The patient was taken to the operative suite and properly positioned. The area to be treated was defined and confirmed by the patient and physician. The area for Botox injection was marked.    Cosmetic procedure: A total of 40 Units were injected into sites at the forehead  and glabella. The patient tolerated the procedure well and there were no complications noted. Patient was given wound care instructions and will follow-up in approximately 3 months phone call.         Soft Tissue Augmentation Procedure Note: Cosmetic      Dermatology Problem List:  1. Solar lentigines, seborrheic keratoses on face  2. Banal-appearing melanocytic nevi on the trunk  3. Melasma   - Cyspera started 1/5/22   - hydroquinone 8% started 6/18/21  -hydroquinone 4% cream - past tx  -Azelaic acid  -Chemical peels should be considered if not improved      Procedure Date: Sep 16, 2022     Attending Staff: Dr. Gilda Hernandez    Resident: N/A    Assistant: Natalie Byrnes RN    Diagnosis: Facial rhytides and facical volume depletion    Location: bilateral cheeks  Product: Restylane Lyft  Amount: 1.5 mL  Lot #: [id number]  Exp Date: 2025-01-31    Description of Operation/Procedure:   The nature and purpose of the procedure, associated risks, possible consequences and complications, and alternative methods of treatment were explained in detail including but not limited to bruising, pain, redness,lumps/bumps, granuloma formation, scar blindness, stroke, ulceration, ischemia, under correction, over correction, swelling, possible need for multiple treatments, infection, granuloma, pain, dyspigmentation, numbness, weakness or tingling were explained to the patient. We discussed that multiple treatments may be required. Injections on bone. The patient verbalized understanding. Photo consent and signed informed consent were obtained.Time-out was performed and patient denied history of severe allergy to bees.  Denies recent upcoming dental procedures or covid vaccine in the last 2 weeks.       The facial areas were cleansed with technicare and injections were performed as above. No complications noted. The patient tolerated the procedure well and there were no complications. Ice was provided post-procedure. The patient was  provided after care instructions and will send a picture later tonight.    The patient will pay cosmetic fee today.         Clinical Follow-Up:  Phone visit. And then in person. Plan for botox in March    Staff Involved:  Scribe/Staff    Scribe Disclosure:   I, Ricardo Garcia, am serving as a scribe to document services personally performed by this physician, Dr. Gilda Hernandez, based on data collection and the provider's statements to me.     Provider Disclosure:   The documentation recorded by the scribe accurately reflects the services I personally performed and the decisions made by me.    Gilda Hernandez MD    Department of Dermatology  [facility]: Phone: [phone], Fax:[phone]  [facility] [place]: Phone: [phone], Fax: [phone]        Again, thank you for allowing me to participate in the care of your patient.        Sincerely,        Gilda Hernandez MD     No

## 2022-10-07 ENCOUNTER — TELEPHONE (OUTPATIENT)
Dept: DERMATOLOGY | Facility: CLINIC | Age: 42
End: 2022-10-07

## 2022-10-07 NOTE — TELEPHONE ENCOUNTER
[facility] Call Center    Phone Message    May a detailed message be left on voicemail: yes     Reason for Call: Other: Pt called and will not be able to make it to her appt today. Please call her back to r/s. Thanks     Action Taken: Message routed to:  Adult Clinics: Dermatology p [id number]    Travel Screening: Not Applicable

## 2022-10-07 NOTE — TELEPHONE ENCOUNTER
[facility] Call Center    Phone Message    May a detailed message be left on voicemail: yes     Reason for Call: Other: Please call the pt back to r/s her botox. Thanks      Action Taken: Message routed to:  Adult Clinics: Dermatology p [id number]    Travel Screening: Not Applicable

## 2022-10-13 ENCOUNTER — ALLIED HEALTH/NURSE VISIT (OUTPATIENT)
Dept: DERMATOLOGY | Facility: CLINIC | Age: 42
End: 2022-10-13
Payer: COMMERCIAL

## 2022-10-13 DIAGNOSIS — L81.1 MELASMA: ICD-10-CM

## 2022-10-13 DIAGNOSIS — L81.4 SOLAR LENTIGINOSIS: Primary | ICD-10-CM

## 2022-10-13 PROCEDURE — 96999 UNLISTED SPEC DERM SVC/PX: CPT | Performed by: DERMATOLOGY

## 2022-10-13 ASSESSMENT — PAIN SCALES - GENERAL: PAINLEVEL: NO PAIN (0)

## 2022-10-13 NOTE — NURSING NOTE
Marium Moss comes  into clinic today at the request of   Ordering Provider for chemical peel .    Nursing Peel Treatment Record:  Oct 13, 2022    Pre peel:    Any changes in health or medications: No    Strawberry or aspirin allergy (If yes, then no salicylic acid peels to be given and procedure to be held):  No    Sulfa allergy (If yes, then SkinCeuticals Sensitive Skin peel procedure to be held):  No    Is the patient taking Valtrex:  No.  If so, date started:      Pregnant or breastfeeding (If yes, peel procedure to be held): No. Spoke with  who ok'd proceeding with peel and to advise pt to call the clnic if any cold sores develop     Baseline photo obtained (3 views): Yes    Areas treated today: face    Treatment #: 1.    Was retinoid stopped 7 days prior to peel: Yes    Peel Type: Illuminize peel(Skin Medica)    Has patient had electrolysis, depilatory creams, waxing or laser hair removal in the last week to treatment site: No.  If yes, then hold peel.    Peel record:    Eye shields were placed.    Area to be treated was cleansed with Simply Clean Cleanser using rough 4X4 gauze pads.    Dermaplaning was performed: no.    Area was then toned with with the Conditioning Solution using rough 4X4 gauze pads.    Then, the areas were degreased with acetone. Time-out was performed to evaluate for any sensitive skin.      Hydrabalm was then applied to the lips, perinasal region and near the outer canthi.     The peel was applied with taylor swabs for 2 passes, then removed with water dampened 4X4 soft gauze.    CE Ferulic, Phytocorrective gel and epidermal repair topicals were applied and followed by Sunscreen (Sheer Physical UV defence SPF 50).         Post peel:    Patient reminded of need to wait to use medicated creams until the skin has healed. This occurs five to seven days later. Post peel care instructions provided including need for sun avoidance. Patient tolerated peel well, no complications  noted.     Payment:  The patient paid $270 for  this peel. This is Peel 1/3 today        This service provided today was under the supervising provider of the day , who was available if needed.    Marianne Haywood RN

## 2022-10-13 NOTE — PATIENT INSTRUCTIONS
Skin Peel Post Care Instructions    I will experience redness, swelling, peeling, pain, and heat sensation which can last 5-10days and may persist for 2-3weeks. I may experience itching, or acne. Risks are permanent scarring, temporary or permanent skin lightening or darkening, infection, and eye injury. I understand my outcome could be no improvement or slight improvement. Multiple treatments may be required.     What can I expect?  Skin peeling for three to five days  Flaking  Skin darkening  Redness    How do I take care of my skin?  Patient compliance is mandatory for an optimal outcome and to avoid complications  Wear sunscreen (SPF 30 or greater) and a hat. Stay out of the sun for three to four weeks   Use a gentle skin care regimen with a sunscreen with at least an SPF of 30 or more. Examples include the following:   SkinCeuticals Gentle Cleanser and SkinCeuticals Physical Matte UV DEFENSE SPF 50   Cetaphil Soap followed by Cetaphil Sunscreen in the am and pm  Put on a bland moisturizer (Aquaphor or Vaseline) if sunscreen stings  Do not pick at peel or peel the skin. This will cause scarring.  Wait to use medicated creams until the skin has healed. This occurs five to seven days later  You can use make-up after 24 hours. Make sure make-up does NOT contain salicylic acid.   Eye make-up and lipstick are okay immediately after procedure  Do not use facial scrubs, depilatories, steam, any exfoliation procedures, etc. on your face (or treated area of skin) for one week post-peel    When should I call the doctor?  Cold sores  Swelling  Crusting    Who should I call with questions?  [facility]: [phone]   [facility]: [phone]  For urgent needs outside of business hours call the [facility] at [phone] and ask for the dermatology resident on call

## 2022-11-07 ENCOUNTER — TELEPHONE (OUTPATIENT)
Dept: DERMATOLOGY | Facility: CLINIC | Age: 42
End: 2022-11-07

## 2022-11-07 NOTE — TELEPHONE ENCOUNTER
[facility] Call Center    Phone Message    May a detailed message be left on voicemail: yes     Reason for Call: Other: Patient has a nurse only appointment scheduled for 11/15/2022 and needs to reschedule. Any day after 11/24/2022 will work.     Action Taken: Message routed to:  Adult Clinics: Dermatology p [id number] and Other: Derm    Travel Screening: Not Applicable

## 2022-11-08 NOTE — TELEPHONE ENCOUNTER
Called pt to reschedule appointment for a chemical peel on 11/15. Pt rescheduled to 1/17 at 1530.    Sneha Zuleta RN on 11/8/2022 at 10:49 AM

## 2023-01-17 ENCOUNTER — TELEPHONE (OUTPATIENT)
Dept: DERMATOLOGY | Facility: CLINIC | Age: 43
End: 2023-01-17

## 2023-01-17 ENCOUNTER — MYC MEDICAL ADVICE (OUTPATIENT)
Dept: DERMATOLOGY | Facility: CLINIC | Age: 43
End: 2023-01-17

## 2023-01-17 NOTE — TELEPHONE ENCOUNTER
[facility] Call Center    Phone Message    May a detailed message be left on voicemail: yes     Reason for Call: Other: Per Pt she forgot to stop taking one of the creams she was supposed to stop a week before today's appointment. Please call to reschedule. [phone]     Action Taken: Message routed to:  Other: [facility]    Travel Screening: Not Applicable

## 2023-01-17 NOTE — TELEPHONE ENCOUNTER
Writer returned pt's call and got her scheduled for next week for a chemical peel.    Sneha Zuleta RN on 1/17/2023 at 11:07 AM

## 2023-01-27 ENCOUNTER — ALLIED HEALTH/NURSE VISIT (OUTPATIENT)
Dept: DERMATOLOGY | Facility: CLINIC | Age: 43
End: 2023-01-27
Payer: COMMERCIAL

## 2023-01-27 DIAGNOSIS — L81.1 MELASMA: ICD-10-CM

## 2023-01-27 DIAGNOSIS — L81.4 SOLAR LENTIGINOSIS: Primary | ICD-10-CM

## 2023-01-27 PROCEDURE — 96999 UNLISTED SPEC DERM SVC/PX: CPT

## 2023-01-27 NOTE — NURSING NOTE
Marium Moss comes into clinic today at the request of Dr. Hernandez Ordering Provider for peel.      This service provided today was under the supervising provider of the day Dr. Hernandez, who was available if needed.    Alyx Baer RN    Nursing Peel Treatment Record:  Jan 27, 2023    Pre peel:    Any changes in health or medications: Yes, added Prozac to medications     [place] or aspirin allergy (If yes, then no salicylic acid peels to be given and procedure to be held):  No    Sulfa allergy (If yes, then SkinCeuticals Sensitive Skin peel procedure to be held):  No    Is the patient taking Valtrex:  No.  If so, date started:      Pregnant or breastfeeding (If yes, peel procedure to be held): N/A    Baseline photo obtained (3 views): No    Areas treated today: full face     Treatment #: 2.    Was retinoid stopped 7 days prior to peel: N/A    Peel Type: Illuminize peel(Skin Medica)    Has patient had electrolysis, depilatory creams, waxing or laser hair removal in the last week to treatment site: No.  If yes, then hold peel.    Peel record:    Eye shields were placed.    Area to be treated was cleansed with Simply Clean Cleanser using rough 4X4 gauze pads.    Dermaplaning was performed: no.    Area was then toned with with the Conditioning Solution using rough 4X4 gauze pads.    Then, the areas were degreased with acetone. Time-out was performed to evaluate for any sensitive skin.      Hydrabalm was then applied to the lips, perinasal region and near the outer canthi.     The peel was applied with taylor swabs for 3 passes.    CE Ferulic, Phytocorrective gel and epidermal repair topicals were applied and followed by Sunscreen (Sheer Physical UV defence SPF 50).     Additional treatment notes:    Post peel:    Patient reminded of need to wait to use medicated creams until the skin has healed. This occurs five to seven days later. Post peel care instructions provided including need for sun avoidance. Patient  tolerated peel well, no complications noted.     Payment:  The patient paid for a package of 3. This is 2/3. Next peel scheduled for 3/14/23.

## 2023-01-27 NOTE — PATIENT INSTRUCTIONS
Skin Peel Post Care Instructions    I will experience redness, swelling, peeling, pain, and heat sensation which can last 5-10days and may persist for 2-3weeks. I may experience itching, or acne. Risks are permanent scarring, temporary or permanent skin lightening or darkening, infection, and eye injury. I understand my outcome could be no improvement or slight improvement. Multiple treatments may be required.     What can I expect?  Skin peeling for three to five days  Flaking  Skin darkening  Redness    How do I take care of my skin?  Patient compliance is mandatory for an optimal outcome and to avoid complications  Wear sunscreen (SPF 30 or greater) and a hat. Stay out of the sun for three to four weeks   Use a gentle skin care regimen with a sunscreen with at least an SPF of 30 or more. Examples include the following:   SkinCeuticals Gentle Cleanser and SkinCeuticals Physical Matte UV DEFENSE SPF 50   Cetaphil Soap followed by Cetaphil Sunscreen in the am and pm  Put on a bland moisturizer (Aquaphor or Vaseline) if sunscreen stings  Do not pick at peel or peel the skin. This will cause scarring.  Wait to use medicated creams until the skin has healed. This occurs five to seven days later  You can use make-up after 24 hours. Make sure make-up does NOT contain salicylic acid.   Eye make-up and lipstick are okay immediately after procedure  Do not use facial scrubs, depilatories, steam, any exfoliation procedures, etc. on your face (or treated area of skin) for one week post-peel    When should I call the doctor?  Cold sores  Swelling  Crusting    Who should I call with questions?  [facility]: [phone]   [facility]: [phone]  For urgent needs outside of business hours call the [facility] at [phone] and ask for the dermatology resident on call

## 2023-03-14 ENCOUNTER — ALLIED HEALTH/NURSE VISIT (OUTPATIENT)
Dept: DERMATOLOGY | Facility: CLINIC | Age: 43
End: 2023-03-14
Payer: COMMERCIAL

## 2023-03-14 DIAGNOSIS — L81.4 SOLAR LENTIGINOSIS: Primary | ICD-10-CM

## 2023-03-14 PROCEDURE — 99207 PR NO CHARGE NURSE ONLY: CPT | Performed by: DERMATOLOGY

## 2023-03-14 PROCEDURE — 96999 UNLISTED SPEC DERM SVC/PX: CPT | Performed by: DERMATOLOGY

## 2023-03-14 NOTE — PATIENT INSTRUCTIONS
Skin Peel Post Care Instructions    I will experience redness, swelling, peeling, pain, and heat sensation which can last 5-10days and may persist for 2-3weeks. I may experience itching, or acne. Risks are permanent scarring, temporary or permanent skin lightening or darkening, infection, and eye injury. I understand my outcome could be no improvement or slight improvement. Multiple treatments may be required.     What can I expect?  Skin peeling for three to five days  Flaking  Skin darkening  Redness    How do I take care of my skin?  Patient compliance is mandatory for an optimal outcome and to avoid complications  Wear sunscreen (SPF 30 or greater) and a hat. Stay out of the sun for three to four weeks   Use a gentle skin care regimen with a sunscreen with at least an SPF of 30 or more. Examples include the following:   SkinCeuticals Gentle Cleanser and SkinCeuticals Physical Matte UV DEFENSE SPF 50   Cetaphil Soap followed by Cetaphil Sunscreen in the am and pm  Put on a bland moisturizer (Aquaphor or Vaseline) if sunscreen stings  Do not pick at peel or peel the skin. This will cause scarring.  Wait to use medicated creams until the skin has healed. This occurs five to seven days later  You can use make-up after 24 hours. Make sure make-up does NOT contain salicylic acid.   Eye make-up and lipstick are okay immediately after procedure  Do not use facial scrubs, depilatories, steam, any exfoliation procedures, etc. on your face (or treated area of skin) for one week post-peel    When should I call the doctor?  Cold sores  Swelling  Crusting    Who should I call with questions?  [facility]: [phone]   [facility]: [phone]  For urgent needs outside of business hours call the [facility] at [phone] and ask for the dermatology resident on call

## 2023-03-14 NOTE — NURSING NOTE
Marium Moss comes into clinic today at the request of Dr Hernandez Ordering Provider for chemical peel.      This service provided today was under the supervising provider of the day Dr Lamb, who was available if needed.    Sneha Zuleta RN    Nursing Peel Treatment Record:  Mar 14, 2023    Pre peel:    Any changes in health or medications: No    Strawberry or aspirin allergy (If yes, then no salicylic acid peels to be given and procedure to be held):  No    Sulfa allergy (If yes, then SkinCeuticals Sensitive Skin peel procedure to be held):  No    Is the patient taking Valtrex:  No.  If so, date started:  N/A    Pregnant or breastfeeding (If yes, peel procedure to be held): No    Baseline photo obtained (3 views): Yes    Areas treated today: face    Treatment #: 3.    Was retinoid stopped 7 days prior to peel: No    Peel Type: Illuminize peel(Skin Medica)    Has patient had electrolysis, depilatory creams, waxing or laser hair removal in the last week to treatment site: No.  If yes, then hold peel.    Peel record:    Eye shields were placed.    Area to be treated was cleansed with Simply Clean Cleanser using rough 4X4 gauze pads.    Dermaplaning was performed: no.    Area was then toned with with the Conditioning Solution using rough 4X4 gauze pads.    Then, the areas were degreased with acetone. Time-out was performed to evaluate for any sensitive skin.      Hydrabalm was then applied to the lips, perinasal region and near the outer canthi.     The peel was applied with taylor swabs for 3 passes, then removed with water dampened 4X4 soft gauze.    CE Ferulic, Phytocorrective gel and epidermal repair topicals were applied and followed by Sunscreen (Sheer Physical UV defence SPF 50).       Post peel:    Patient reminded of need to wait to use medicated creams until the skin has healed. This occurs five to seven days later. Post peel care instructions provided including need for sun avoidance. Patient  tolerated peel well, no complications noted.     Payment:  The patient for a package of 3, this is 3/3.    Pt wanted to schedule 3 more peels, will need to purchase package at next visit.

## 2023-03-31 ENCOUNTER — OFFICE VISIT (OUTPATIENT)
Dept: DERMATOLOGY | Facility: CLINIC | Age: 43
End: 2023-03-31

## 2023-03-31 DIAGNOSIS — L98.8 RHYTIDES: Primary | ICD-10-CM

## 2023-03-31 PROCEDURE — 96999 UNLISTED SPEC DERM SVC/PX: CPT | Performed by: DERMATOLOGY

## 2023-03-31 NOTE — NURSING NOTE
Marium Moss's goals for this visit include:   Chief Complaint   Patient presents with     Botox       She requests these members of her care team be copied on today's visit information:     PCP: Clinic, [facility]    Referring Provider:  No referring provider defined for this encounter.    There were no vitals taken for this visit.    Do you need any medication refills at today's visit? Nancy Lopes, PAULIE on 3/31/2023 at 3:57 PM

## 2023-03-31 NOTE — LETTER
3/31/2023         RE: Marium Moss  [address] [address]        Dear Colleague,    Thank you for referring your patient, Marium Moss, to the [facility]. Please see a copy of my visit note below.    Botulinum Injection Procedure Note:  Cosmetic  Procedure Date: Mar 31, 2023    Dermatology Problem List:  1. Solar lentigines, seborrheic keratoses on face  2. Banal-appearing melanocytic nevi on the trunk  3. Melasma  - Cyspera started 1/5/22  - hydroquinone 8% started 6/18/21  - hydroquinone 4% cream - past tx  - Azelaic acid  - Chemical peels should be considered if not improved    ATTENDING STAFF SURGEON: Dr. Gilda Hernandez    RESIDENT SURGEON: N/A    NURSE: Angie Encarnacion RN     ANESTHESIA:   None    PREOPERATIVE DIAGNOSIS:   Rhytides    LOCATION: Forehead and glabella    LOT NO: [id number]    EXP DATE: 2025/03    NDC number: 1126-5478-92    OPERATION/PROCEDURE:   Intralesional botulinum toxin injection     Dilution with 0.6 ml preserved sterile normal saline in a 50 Unit Botox Vial.    Total units of botulinum toxin: 40 units    POSTOPERATIVE DIAGNOSIS:   SAME     PREPARATION:   Alcohol swab    DESCRIPTION OF OPERATION/PROCEDURE:   The nature and purpose of the procedure, associated risks including but not limited to bruising, headache or discomfort at the site(s), brow or eyelid droop, headache, double vision, not enough effect or too much effect. Possible consequences and complications, and alternative methods of treatment were explained in detail. Not pregnant. . A signed informed operative consent was obtained.    The patient was taken to the operative suite and properly positioned. The area to be treated was defined and confirmed by the patient and physician. The area for Botox injection was marked.    Cosmetic procedure: A total of 40 Units were injected into sites at the forehead and glabella. The patient tolerated the procedure well and there were no complications  noted. Patient was given wound care instructions and will follow-up in approximately 3 months phone call.         Soft Tissue Augmentation Procedure Note: Cosmetic    Dermatology Problem List:  1. Solar lentigines, seborrheic keratoses on face  2. Banal-appearing melanocytic nevi on the trunk  3. Melasma   - Cyspera started 1/5/22   - hydroquinone 8% started 6/18/21  -hydroquinone 4% cream - past tx  -Azelaic acid  -Chemical peels should be considered if not improved    Attending Staff: Dr. Gilda Hernandez    Resident: N/A    Assistant: Angie Encarnacion RN    Diagnosis: Facial rhytides and facical volume depletion    Location: bilateral cheeks  Product: Restylane Lyft  Amount: 1 mL  Lot #: [id number]  Exp Date: 2025-03-31    Description of Operation/Procedure:   The nature and purpose of the procedure, associated risks, possible consequences and complications, and alternative methods of treatment were explained in detail including but not limited to bruising, pain, redness,lumps/bumps, granuloma formation, scar blindness, stroke, ulceration, ischemia, under correction, over correction, swelling, possible need for multiple treatments, infection, granuloma, pain, dyspigmentation, numbness, weakness or tingling were explained to the patient. We discussed that multiple treatments may be required. Injections on bone. The patient verbalized understanding. Photo consent and signed informed consent were obtained.Time-out was performed and patient denied history of severe allergy to bees.  Denies recent upcoming dental procedures or covid vaccine in the last 2 weeks.       The facial areas were cleansed with hibiclens and injections were performed as above. No complications noted. The patient tolerated the procedure well and there were no complications. Ice was provided post-procedure. T   My chart is delayed.     Will call for urgent issues.   The patient will pay cosmetic fee today.    Additional Assessment and Plan:  # Patient will  restart hydroquinone daily x 3 months, then take break. Wait 24 hours    Clinical Follow-Up:  4 months botox, do chemical peels prior, same peel as last visit. If does well can go up to sal acid 30%. reconsented to risks of dyspigmentation, scarring, injury. hol hdyrqouinone 1 week prior.     Staff Involved:  Scribe/Staff    Scribe Disclosure:   I, Ricardo Garcia, am serving as a scribe to document services personally performed by this physician, Dr. Gilda Hernandez, based on data collection and the provider's statements to me.     Provider Disclosure:   The documentation recorded by the scribe accurately reflects the services I personally performed and the decisions made by me.    Gilda Hernandez MD    Department of Dermatology  [facility]: Phone: [phone], Fax:[phone]  [facility]: Phone: [phone], Fax: [phone]      Again, thank you for allowing me to participate in the care of your patient.        Sincerely,        Gilda Hernandez MD

## 2023-03-31 NOTE — PROGRESS NOTES
Botulinum Injection Procedure Note:  Cosmetic  Procedure Date: Mar 31, 2023    Dermatology Problem List:  1. Solar lentigines, seborrheic keratoses on face  2. Banal-appearing melanocytic nevi on the trunk  3. Melasma  - Cyspera started 1/5/22  - hydroquinone 8% started 6/18/21  - hydroquinone 4% cream - past tx  - Azelaic acid  - Chemical peels should be considered if not improved    ATTENDING STAFF SURGEON: Dr. Gilda Hernandez    RESIDENT SURGEON: N/A    NURSE: Angie Encarnacion RN     ANESTHESIA:   None    PREOPERATIVE DIAGNOSIS:   Rhytides    LOCATION: Forehead and glabella    LOT NO: [id number]    EXP DATE: 2025/03    NDC number: 4152-2371-06    OPERATION/PROCEDURE:   Intralesional botulinum toxin injection     Dilution with 0.6 ml preserved sterile normal saline in a 50 Unit Botox Vial.    Total units of botulinum toxin: 40 units    POSTOPERATIVE DIAGNOSIS:   SAME     PREPARATION:   Alcohol swab    DESCRIPTION OF OPERATION/PROCEDURE:   The nature and purpose of the procedure, associated risks including but not limited to bruising, headache or discomfort at the site(s), brow or eyelid droop, headache, double vision, not enough effect or too much effect. Possible consequences and complications, and alternative methods of treatment were explained in detail. Not pregnant. . A signed informed operative consent was obtained.    The patient was taken to the operative suite and properly positioned. The area to be treated was defined and confirmed by the patient and physician. The area for Botox injection was marked.    Cosmetic procedure: A total of 40 Units were injected into sites at the forehead and glabella. The patient tolerated the procedure well and there were no complications noted. Patient was given wound care instructions and will follow-up in approximately 3 months phone call.         Soft Tissue Augmentation Procedure Note: Cosmetic    Dermatology Problem List:  1. Solar lentigines, seborrheic keratoses on face  2.  Banal-appearing melanocytic nevi on the trunk  3. Melasma   - Cyspera started 1/5/22   - hydroquinone 8% started 6/18/21  -hydroquinone 4% cream - past tx  -Azelaic acid  -Chemical peels should be considered if not improved    Attending Staff: Dr. Gilda Hernandez    Resident: N/A    Assistant: Angei Encarnacion RN    Diagnosis: Facial rhytides and facical volume depletion    Location: bilateral cheeks  Product: Restylane Lyft  Amount: 1 mL  Lot #: [id number]  Exp Date: 2025-03-31    Description of Operation/Procedure:   The nature and purpose of the procedure, associated risks, possible consequences and complications, and alternative methods of treatment were explained in detail including but not limited to bruising, pain, redness,lumps/bumps, granuloma formation, scar blindness, stroke, ulceration, ischemia, under correction, over correction, swelling, possible need for multiple treatments, infection, granuloma, pain, dyspigmentation, numbness, weakness or tingling were explained to the patient. We discussed that multiple treatments may be required. Injections on bone. The patient verbalized understanding. Photo consent and signed informed consent were obtained.Time-out was performed and patient denied history of severe allergy to bees.  Denies recent upcoming dental procedures or covid vaccine in the last 2 weeks.       The facial areas were cleansed with hibiclens and injections were performed as above. No complications noted. The patient tolerated the procedure well and there were no complications. Ice was provided post-procedure. T   My chart is delayed.     Will call for urgent issues.   The patient will pay cosmetic fee today.    Additional Assessment and Plan:  # Patient will restart hydroquinone daily x 3 months, then take break. Wait 24 hours    Clinical Follow-Up:  4 months botox, do chemical peels prior, same peel as last visit. If does well can go up to sal acid 30%. reconsented to risks of dyspigmentation, scarring,  injury. hol hdyrqouinone 1 week prior.     Staff Involved:  Scribe/Staff    Scribe Disclosure:   I, Ricardo Garcia, am serving as a scribe to document services personally performed by this physician, Dr. Gilda Hernandez, based on data collection and the provider's statements to me.     Provider Disclosure:   The documentation recorded by the scribe accurately reflects the services I personally performed and the decisions made by me.    Gilda Hernandez MD    Department of Dermatology  [facility]: Phone: [phone], Fax:[phone]  [facility]: Phone: [phone], Fax: [phone]

## 2023-04-22 ENCOUNTER — HEALTH MAINTENANCE LETTER (OUTPATIENT)
Age: 43
End: 2023-04-22

## 2023-08-02 NOTE — PROGRESS NOTES
Botulinum Injection Procedure Note:  Cosmetic  Procedure Date: Aug 4, 2023    Dermatology Problem List:  1. Solar lentigines, seborrheic keratoses on face  2. Banal-appearing melanocytic nevi on the trunk  3. Melasma  - Cyspera started 1/5/22  - hydroquinone 8% started 6/18/21  - hydroquinone 4% cream - past tx  - Azelaic acid  - Chemical peels should be considered if not improved    ATTENDING STAFF SURGEON: Dr. Gilda Hernandez    RESIDENT SURGEON: N/A    NURSE: María ZHENG     ANESTHESIA:   None    PREOPERATIVE DIAGNOSIS:   Rhytides    LOCATION: lateral Forehead and glabella    LOT NO: [id number]    EXP DATE: 05/2026    NDC number: 6987-4313-96    OPERATION/PROCEDURE:   Intralesional botulinum toxin injection     Dilution with 0.6 ml preserved sterile normal saline in a 50 Unit Botox Vial.    Total units of botulinum toxin: 26 units    POSTOPERATIVE DIAGNOSIS:   SAME     PREPARATION:   Chlorhexidine after vanicream facial wash    DESCRIPTION OF OPERATION/PROCEDURE:   The nature and purpose of the procedure, associated risks including but not limited to bruising, headache or discomfort at the site(s), brow or eyelid droop, headache, double vision, not enough effect or too much effect. Possible consequences and complications, and alternative methods of treatment were explained in detail. Not pregnant. . A signed informed operative consent was obtained.    The patient was taken to the operative suite and properly positioned. The area to be treated was defined and confirmed by the patient and physician. The area for Botox injection was marked.    Cosmetic procedure: A total of 20 Units were injected into sites at the forehead and glabella. The patient tolerated the procedure well and there were no complications noted. Patient was given wound care instructions and will follow-up in approximately 2-3 weeks phone call        Soft Tissue Augmentation Procedure Note: Cosmetic    Dermatology Problem List:  1. Solar lentigines,  seborrheic keratoses on face  2. Banal-appearing melanocytic nevi on the trunk  3. Melasma   - Cyspera started 1/5/22   - hydroquinone 8% started 6/18/21  -hydroquinone 4% cream - past tx  -Azelaic acid  -Chemical peels should be considered if not improved    Attending Staff: Dr. Gilda Hernandez    Resident: N/A    Assistant: Angie Encarnacion RN    Diagnosis: Facial rhytides and facical volume depletion    Location: bilateral cheeks  Product: Restylane Lyft and Restylane Counter   Amount: 1 mL  Lot #: [id number] and countour [id number]  Exp Date: 09/30/2024 and 02/28/2024    Description of Operation/Procedure:   The nature and purpose of the procedure, associated risks, possible consequences and complications, and alternative methods of treatment were explained in detail including but not limited to bruising, pain, redness,lumps/bumps, granuloma formation, scar blindness, stroke, ulceration, ischemia, under correction, over correction, swelling, possible need for multiple treatments, infection,   pain, dyspigmentation, numbness, weakness or tingling were explained to the patient. We discussed that multiple treatments may be required. Injections on bone. The patient verbalized understanding. Photo consent and signed informed consent were obtained.Time-out was performed and patient denied history of severe allergy to bees.  Denies recent upcoming dental procedures or covid vaccine in the last 2 weeks.       The facial areas were cleansed with hibiclens and injections were performed as above. No complications noted. The patient tolerated the procedure well and there were no complications. Ice was provided post-procedure. T   My chart is delayed.     Will call for urgent issues.   The patient will pay cosmetic fee today.    Additional Assessment and Plan:  # Patient will restart hydroquinone daily x 3 months, then take break. Wait 24 hours    Clinical Follow-Up:  4 months botox, do 30% sal acid peel at next visit. The patient paid for 2  syringes of lyft. WE did change to 1 lyft and 1 contour instead but did not change the quote She agreed to the change.     Staff Involved:  Scribe/Staff    Scribe Disclosure:   I, Ricardo Garcia, am serving as a scribe to document services personally performed by this physician, Dr. Gilda Hernandez, based on data collection and the provider's statements to me.     Provider Disclosure:   The documentation recorded by the scribe accurately reflects the services I personally performed and the decisions made by me.    Gilda Hernandez MD    Department of Dermatology  [facility]: Phone: [phone], Fax:[phone]  [facility]: Phone: [phone], Fax: [phone]

## 2023-08-04 ENCOUNTER — OFFICE VISIT (OUTPATIENT)
Dept: DERMATOLOGY | Facility: CLINIC | Age: 43
End: 2023-08-04

## 2023-08-04 DIAGNOSIS — L98.8 RHYTIDES: Primary | ICD-10-CM

## 2023-08-04 PROCEDURE — 96999 UNLISTED SPEC DERM SVC/PX: CPT | Performed by: DERMATOLOGY

## 2023-08-04 ASSESSMENT — PAIN SCALES - GENERAL: PAINLEVEL: NO PAIN (0)

## 2023-08-04 NOTE — PATIENT INSTRUCTIONS
Filler Information:    Risks of the procedure:I will have pain, bruising, redness, and swelling after the procedure and lasting for approximately 1-3 weeks. Risks are lumps/bumps, skin discoloration, bleeding, numbness, infection, granuloma formation, scar, ulceration, under correction, over correction and rarely, risks of stroke and blindness. Multiple treatments may be required.      What are  fillers?    Fillers are injected into the skin to soften crease or folds, support areas of volume loss or contour specific facial areas.  You may experience a mild to moderate amount of stinging or aching sensation post injection.  To ensure an even correction, the physician will massage the area treated, which may cause a temporary amount of redness to your skin.  Bruising at the site of injection is common and may last two weeks  Temporary minimal to moderate swelling can be expected, which should gradually improve following injection  It is normal to experience some tenderness at the treatment site for a few days    After treatment care instructions:  Apply an ice pack or cold compress to the injection area after treatment to help reduce swelling.  Keep your head elevated (even while sleeping) as much as possible and avoid sleeping on your side or stomach  No alcohol consumption or exercise for the first 24 hours after treatment.  Do not touch the treated area for 6 hours  You may use make-up, creams and sunscreens after 24 hours  You may return to normal/routine activities but you should check with your physician for their recommendation  Avoid excessive scrubbing or rubbing of the injection area  If you have previously suffered from cold sores, there is a risk that the needle punctures around the mouth/lips could contribute to another recurrence  Immediately report to your physician if you have any of the following:  Delayed swelling happening 7-14 days after treatment  Numbness lasting 3-4 days  Muscle weakness in the  area of injection  Severe pain  Dusky discoloration of one part of the face  Bruising  Changes in vision or eye pain  Cold sore  Avoid COVID vaccination 2 weeks after filler and dental procedures 4 weeks after filler    Who should I call with questions?  [facility]: [phone]   [facility]: [phone]  For urgent needs outside of business hours call the [facility] at [phone] and ask for the resident on call  MyChart messages may be delayed, call for urgent issues     Botulinum Toxin (Botox/Dysport) Cosmetic Information    Risks: I will have pain, redness, and swelling. I may have bruising, headache or discomfort at the site(s). Risks are asymmetry, numbness, twitching, brow droop, eyelid droop, headache, double vision, not enough effect or too much effect, difficulty whistling or drinking, loss of muscle tone, headache or infection. A touch-up or multiple treatments may be required.    About Botulinum Toxin (Botox/Dysport)  You have inquired about Botox cosmetic. Botulinum toxin is a purified protein derivative developed from bacteria. It has the ability to immobilize facial muscles that create dynamic wrinkles. Dynamic wrinkles develop due to muscle contraction, and over time become permanent folds in the skin, even when the muscles are not flexed. The use of Botox results in a very pleasing cosmetic effect for many people, leading to a more youthful, relaxed appearance. Botox can be used in combination with injectable fillers, chemical peels, and laser resurfacing to treat deeper wrinkles. It also has become an accepted form of treatment for hyperhidrosis, (or excessive sweating) in people who have not responded to other therapies.     With my treatment side effects may include bruising, headache or discomfort at the site(s). Asymmetry may occur and a touch-up may be required. Risks of this procedure include numbness, muscle twitching,  brow or eyelid droop, headache, double vision, not enough effect or too much effect, difficulty whistling or drinking from a straw, loss of muscle tone, headache or infection.    Post-Procedure Instructions:   Do not rub the treated area. Avoid exercise for the 24 hours following the procedure. You may move the muscles. Some people will experience bruising or eyelid ptosis (drooping) after injection. This is temporary and usually mild. Eyelid ptosis may be treated with special eye drops. Call your doctor if you have any questions or concerns after your treatment.     Do not massage the area for 24 hours  Stay upright for 4 hours  Do not push on the injected area  Contact us if you have asymmetry at 2 weeks  Do not apply skin products to the area for 24 hours    Who should I call with questions?  [facility]: [phone]  [facility]: [phone]  For urgent needs outside of business hours call the [facility] at [phone] and ask for the resident on call  L[employer] messaging may be delayed

## 2023-08-04 NOTE — LETTER
8/4/2023         RE: Marium Moss  [address] [address]        Dear Colleague,    Thank you for referring your patient, Marium Moss, to the [facility]. Please see a copy of my visit note below.    Botulinum Injection Procedure Note:  Cosmetic  Procedure Date: Aug 4, 2023    Dermatology Problem List:  1. Solar lentigines, seborrheic keratoses on face  2. Banal-appearing melanocytic nevi on the trunk  3. Melasma  - Cyspera started 1/5/22  - hydroquinone 8% started 6/18/21  - hydroquinone 4% cream - past tx  - Azelaic acid  - Chemical peels should be considered if not improved    ATTENDING STAFF SURGEON: Dr. Gilda Hernandez    RESIDENT SURGEON: N/A    NURSE: María ZHENG     ANESTHESIA:   None    PREOPERATIVE DIAGNOSIS:   Rhytides    LOCATION: lateral Forehead and glabella    LOT NO: [id number]    EXP DATE: 05/2026    NDC number: 0437-2084-81    OPERATION/PROCEDURE:   Intralesional botulinum toxin injection     Dilution with 0.6 ml preserved sterile normal saline in a 50 Unit Botox Vial.    Total units of botulinum toxin: 26 units    POSTOPERATIVE DIAGNOSIS:   SAME     PREPARATION:   Chlorhexidine after vanicream facial wash    DESCRIPTION OF OPERATION/PROCEDURE:   The nature and purpose of the procedure, associated risks including but not limited to bruising, headache or discomfort at the site(s), brow or eyelid droop, headache, double vision, not enough effect or too much effect. Possible consequences and complications, and alternative methods of treatment were explained in detail. Not pregnant. . A signed informed operative consent was obtained.    The patient was taken to the operative suite and properly positioned. The area to be treated was defined and confirmed by the patient and physician. The area for Botox injection was marked.    Cosmetic procedure: A total of 20 Units were injected into sites at the forehead and glabella. The patient tolerated the procedure well and  there were no complications noted. Patient was given wound care instructions and will follow-up in approximately 2-3 weeks phone call        Soft Tissue Augmentation Procedure Note: Cosmetic    Dermatology Problem List:  1. Solar lentigines, seborrheic keratoses on face  2. Banal-appearing melanocytic nevi on the trunk  3. Melasma   - Cyspera started 1/5/22   - hydroquinone 8% started 6/18/21  -hydroquinone 4% cream - past tx  -Azelaic acid  -Chemical peels should be considered if not improved    Attending Staff: Dr. Gilda Hernandez    Resident: N/A    Assistant: Angie Encanracion RN    Diagnosis: Facial rhytides and facical volume depletion    Location: bilateral cheeks  Product: Restylane Lyft and Restylane Counter   Amount: 1 mL  Lot #: [id number] and countour [id number]  Exp Date: 09/30/2024 and 02/28/2024    Description of Operation/Procedure:   The nature and purpose of the procedure, associated risks, possible consequences and complications, and alternative methods of treatment were explained in detail including but not limited to bruising, pain, redness,lumps/bumps, granuloma formation, scar blindness, stroke, ulceration, ischemia, under correction, over correction, swelling, possible need for multiple treatments, infection,   pain, dyspigmentation, numbness, weakness or tingling were explained to the patient. We discussed that multiple treatments may be required. Injections on bone. The patient verbalized understanding. Photo consent and signed informed consent were obtained.Time-out was performed and patient denied history of severe allergy to bees.  Denies recent upcoming dental procedures or covid vaccine in the last 2 weeks.       The facial areas were cleansed with hibiclens and injections were performed as above. No complications noted. The patient tolerated the procedure well and there were no complications. Ice was provided post-procedure. T   My chart is delayed.     Will call for urgent issues.   The patient will  pay cosmetic fee today.    Additional Assessment and Plan:  # Patient will restart hydroquinone daily x 3 months, then take break. Wait 24 hours    Clinical Follow-Up:  4 months botox, do 30% sal acid peel at next visit. The patient paid for 2 syringes of lyft. WE did change to 1 lyft and 1 contour instead but did not change the quote She agreed to the change.     Staff Involved:  Scribe/Staff    Scribe Disclosure:   I, Ricardo Garcia, am serving as a scribe to document services personally performed by this physician, Dr. Gilda Hernandez, based on data collection and the provider's statements to me.     Provider Disclosure:   The documentation recorded by the scribe accurately reflects the services I personally performed and the decisions made by me.    Gilda Hernandez MD    Department of Dermatology  [facility]: Phone: [phone], Fax:[phone]  [facility]: Phone: [phone], Fax: [phone]      Again, thank you for allowing me to participate in the care of your patient.        Sincerely,        Gilda Hernandez MD

## 2023-08-23 ENCOUNTER — TELEPHONE (OUTPATIENT)
Dept: DERMATOLOGY | Facility: CLINIC | Age: 43
End: 2023-08-23
Payer: COMMERCIAL

## 2023-12-15 ENCOUNTER — OFFICE VISIT (OUTPATIENT)
Dept: DERMATOLOGY | Facility: CLINIC | Age: 43
End: 2023-12-15

## 2023-12-15 DIAGNOSIS — L98.8 RHYTIDES: Primary | ICD-10-CM

## 2023-12-15 PROCEDURE — 96999 UNLISTED SPEC DERM SVC/PX: CPT | Performed by: DERMATOLOGY

## 2023-12-15 NOTE — LETTER
12/15/2023         RE: Marium Moss  [address] [address]        Dear Colleague,    Thank you for referring your patient, Marium Moss, to the [facility]. Please see a copy of my visit note below.         Botulinum Injection Procedure Note:  Cosmetic    Dermatology Problem List:  1. Solar lentigines, seborrheic keratoses on face  2. Banal-appearing melanocytic nevi on the trunk  3. Melasma  - Cyspera started 1/5/22  - hydroquinone 8% started 6/18/21  - hydroquinone 4% cream - past tx  - Azelaic acid  - Chemical peels should be considered if not improved    SURGEON: Dr. Gilda Hernandez     NURSE:     Gilda Hernandez MD    Department of Dermatology  [facility]: Phone: [phone], Fax:[phone]  [facility]: Phone: [phone], Fax: [phone]       ANESTHESIA:   None    PREOPERATIVE DIAGNOSIS:   Rhytides    LOCATION: lateral forehead and glabella          OPERATION/PROCEDURE:   Intralesional botulinum toxin injection     Dilution with 0.6cc  preserved sterile normal saline in a 50Unit Botox/     Total units of botulinum toxin: 40     POSTOPERATIVE DIAGNOSIS:   SAME     PREPARATION:   Alcohol swab       DESCRIPTION OF OPERATION/PROCEDURE:   The nature and purpose of the procedure, associated risks including but not limited to bruising, headache or discomfort at the site(s), numbness, muscle twitching, brow or eyelid droop, headache, double vision, not enough effect or too much effect, difficulty whistling or drinking from a straw, loss of muscle tone, or infection. Possible consequences and complications, and alternative methods of treatment were explained in detail. The patient declined a personal or family history of neuromuscular disease prior to the procedure. The patient is not pregnant or breast feeding.    A signed informed operative  consent was obtained.    The patient was taken to the operative suite and properly positioned. The area to be treated was defined and confirmed by the patient and physician. The area for Botox injection was marked.    Cosmetic procedure: Injections were performed. The patient tolerated the procedure well and there were no complications noted.      Clinical Follow-Up: =2-3 weeks by phone if needed and otherwise 3-6 months for repeat botox      Staff Involved:  Staff      .Gilda Hernandez MD    Department of Dermatology  [facility]: Phone: [phone], Fax:[phone]  [facility]: Phone: [phone], Fax: [phone]      Again, thank you for allowing me to participate in the care of your patient.        Sincerely,        Gilda Hernandez MD

## 2023-12-15 NOTE — PATIENT INSTRUCTIONS
Skin Peel Post Care Instructions    I will experience redness, swelling, peeling, pain, and heat sensation which can last 5-10days and may persist for 2-3weeks. I may experience itching, or acne. Risks are permanent scarring, temporary or permanent skin lightening or darkening, infection, and eye injury. I understand my outcome could be no improvement or slight improvement. Multiple treatments may be required.     What can I expect?  Skin peeling for three to five days  Flaking  Skin darkening  Redness    How do I take care of my skin?  Patient compliance is mandatory for an optimal outcome and to avoid complications  Wear sunscreen (SPF 30 or greater) and a hat. Stay out of the sun for three to four weeks   Use a gentle skin care regimen with a sunscreen with at least an SPF of 30 or more. Examples include the following:   SkinCeuticals Gentle Cleanser and SkinCeuticals Physical Matte UV DEFENSE SPF 50   Cetaphil Soap followed by Cetaphil Sunscreen in the am and pm  Put on a bland moisturizer (Aquaphor or Vaseline) if sunscreen stings  Do not pick at peel or peel the skin. This will cause scarring.  Wait to use medicated creams until the skin has healed. This occurs five to seven days later  You can use make-up after 24 hours. Make sure make-up does NOT contain salicylic acid.   Eye make-up and lipstick are okay immediately after procedure  Do not use facial scrubs, depilatories, steam, any exfoliation procedures, etc. on your face (or treated area of skin) for one week post-peel    When should I call the doctor?  Cold sores  Swelling  Crusting    Who should I call with questions?  [facility]: [phone]   [facility]: [phone]  For urgent needs outside of business hours call the [facility] at [phone] and ask for the dermatology resident on call

## 2023-12-15 NOTE — PROGRESS NOTES
Botulinum Injection Procedure Note:  Cosmetic    Dermatology Problem List:  1. Solar lentigines, seborrheic keratoses on face  2. Banal-appearing melanocytic nevi on the trunk  3. Melasma  - Cyspera started 1/5/22  - hydroquinone 8% started 6/18/21  - hydroquinone 4% cream - past tx  - Azelaic acid  - Chemical peels should be considered if not improved    SURGEON: Dr. Gilda Hernandez     NURSE:     Gilda Hernandez MD    Department of Dermatology  [facility]: Phone: [phone], Fax:[phone]  [facility]: Phone: [phone], Fax: [phone]       ANESTHESIA:   None    PREOPERATIVE DIAGNOSIS:   Rhytides    LOCATION: lateral forehead and glabella          OPERATION/PROCEDURE:   Intralesional botulinum toxin injection     Dilution with 0.6cc  preserved sterile normal saline in a 50Unit Botox/     Total units of botulinum toxin: 40     POSTOPERATIVE DIAGNOSIS:   SAME     PREPARATION:   Alcohol swab       DESCRIPTION OF OPERATION/PROCEDURE:   The nature and purpose of the procedure, associated risks including but not limited to bruising, headache or discomfort at the site(s), numbness, muscle twitching, brow or eyelid droop, headache, double vision, not enough effect or too much effect, difficulty whistling or drinking from a straw, loss of muscle tone, or infection. Possible consequences and complications, and alternative methods of treatment were explained in detail. The patient declined a personal or family history of neuromuscular disease prior to the procedure. The patient is not pregnant or breast feeding.    A signed informed operative consent was obtained.    The patient was taken to the operative suite and properly positioned. The area to be treated was defined and confirmed by the patient and physician. The area for Botox injection was marked.    Cosmetic procedure: Injections  were performed. The patient tolerated the procedure well and there were no complications noted.      Clinical Follow-Up: =2-3 weeks by phone if needed and otherwise 3-6 months for repeat botox      Staff Involved:  Staff      .Gilda Hernandez MD    Department of Dermatology  [facility]: Phone: [phone], Fax:[phone]  [facility]: Phone: [phone], Fax: [phone]

## 2023-12-15 NOTE — NURSING NOTE
Drug Administration Record    Drug Name: Botox  Dose: 40 Units  Route administered: IM  NDC #: Botox   Amount of waste(mL): 10 units   Reason for waste: not needed    LOT #: [id number]  SITE: forehead and glabella  : Allergkaroline  EXPIRATION DATE: 05/2026

## 2023-12-15 NOTE — NURSING NOTE
Marium Moss's chief complaint for this visit includes:  Chief Complaint   Patient presents with    Botox     Forehead     PCP: Clinic, [facility] [place] [facility] [place]    Referring Provider:  No referring provider defined for this encounter.    There were no vitals taken for this visit.  Data Unavailable        Allergies   Allergen Reactions    Penicillin [Penicillins] Rash         Do you need any medication refills at today's visit? No

## 2024-02-01 ENCOUNTER — OFFICE VISIT (OUTPATIENT)
Dept: DERMATOLOGY | Facility: CLINIC | Age: 44
End: 2024-02-01

## 2024-02-01 DIAGNOSIS — L81.1 MELASMA: ICD-10-CM

## 2024-02-01 DIAGNOSIS — L98.8 RHYTIDES: Primary | ICD-10-CM

## 2024-02-01 PROCEDURE — 96999 UNLISTED SPEC DERM SVC/PX: CPT | Performed by: DERMATOLOGY

## 2024-02-01 ASSESSMENT — PAIN SCALES - GENERAL: PAINLEVEL: NO PAIN (0)

## 2024-02-01 NOTE — PATIENT INSTRUCTIONS
Skin Peel Post Care Instructions    I will experience redness, swelling, peeling, pain, and heat sensation which can last 5-10days and may persist for 2-3weeks. I may experience itching, or acne. Risks are permanent scarring, temporary or permanent skin lightening or darkening, infection, and eye injury. I understand my outcome could be no improvement or slight improvement. Multiple treatments may be required.     What can I expect?  Skin peeling for three to five days  Flaking  Skin darkening  Redness    How do I take care of my skin?  Patient compliance is mandatory for an optimal outcome and to avoid complications  Wear sunscreen (SPF 30 or greater) and a hat. Stay out of the sun for three to four weeks   Use a gentle skin care regimen with a sunscreen with at least an SPF of 30 or more. Examples include the following:   SkinCeuticals Gentle Cleanser and SkinCeuticals Physical Matte UV DEFENSE SPF 50   Cetaphil Soap followed by Cetaphil Sunscreen in the am and pm  Put on a bland moisturizer (Aquaphor or Vaseline) if sunscreen stings  Do not pick at peel or peel the skin. This will cause scarring.  Wait to use medicated creams until the skin has healed. This occurs five to seven days later  You can use make-up after 24 hours. Make sure make-up does NOT contain salicylic acid.   Eye make-up and lipstick are okay immediately after procedure  Do not use facial scrubs, depilatories, steam, any exfoliation procedures, etc. on your face (or treated area of skin) for one week post-peel    When should I call the doctor?  Cold sores  Swelling  Crusting    Who should I call with questions?  [facility]: [phone]   [facility]: [phone]  For urgent needs outside of business hours call the [facility] at [phone] and ask for the dermatology resident on call

## 2024-02-01 NOTE — LETTER
2/1/2024         RE: Marium Moss  [address] [address]        Dear Colleague,    Thank you for referring your patient, Marium Moss, to the [facility]. Please see a copy of my visit note below.    Nursing Peel Treatment Record:  Feb 1, 2024    Pre peel:  Any changes in health or medications: No  Strawberry or aspirin allergy (If yes, then no salicylic acid peels to be given and procedure to be held):  No  Sulfa allergy (If yes, then SkinCeuticals Sensitive Skin peel procedure to be held):  No  Is the patient taking Valtrex:  No.  If so, date started:  n/a  Pregnant or breastfeeding (If yes, peel procedure to be held): No  Baseline photo obtained (3 views): Yes  Areas treated today: face  Treatment #: 1.  Was retinoid stopped 7 days prior to peel: Yes  Peel Type: Vitalize peel skin medica  Has patient had electrolysis, depilatory creams, waxing or laser hair removal in the last week to treatment site: No.  If yes, then hold peel.    Peel record:  Eye shields were placed.  Area to be treated was cleansed with Simply Clean Cleanser using 4X4 gauze pads.  Dermaplaning was performed: no.  Area was then toned with with the Conditioning Solution using 4X4 gauze pads.  Then, the areas were degreased with acetone. Time-out was performed to evaluate for any sensitive skin.    Hydrabalm was then applied to the lips, perinasal region and near the outer canthi.   The peel was applied with taylor swabs for 3 passes, then removed with water dampened 4X4 soft gauze.  CE Ferulic, Phytocorrective gel and epidermal repair topicals were applied and followed by Sunscreen (Sheer Physical UV defence SPF 50).     Additional treatment notes:n/a    Post peel:  Patient reminded of need to wait to use medicated creams until the skin has healed. This occurs five to seven days later. Post peel care instructions provided including need for sun avoidance. Patient tolerated peel well, no complications  noted.     Payment:  The patient for a package for this peel. This was 1/3    Sneha Zuleta RN on 2/1/2024 at 3:12 PM    Staff Physician Comments:   The RN saw and evaluated the patient (Hemanth Zuleta, ELISHA).  I agree with the assessment and plan and description of the procedure as above.   I was immediately available at all times.       Matt Gomez DO    Department of Dermatology  [facility]: Phone: [phone], Fax:[phone]  [facility]: Phone: [phone], Fax: [phone]      Again, thank you for allowing me to participate in the care of your patient.        Sincerely,        Matt Gomez MD

## 2024-02-01 NOTE — PROGRESS NOTES
Nursing Peel Treatment Record:  Feb 1, 2024    Pre peel:  Any changes in health or medications: No  Strawberry or aspirin allergy (If yes, then no salicylic acid peels to be given and procedure to be held):  No  Sulfa allergy (If yes, then SkinCeuticals Sensitive Skin peel procedure to be held):  No  Is the patient taking Valtrex:  No.  If so, date started:  n/a  Pregnant or breastfeeding (If yes, peel procedure to be held): No  Baseline photo obtained (3 views): Yes  Areas treated today: face  Treatment #: 1.  Was retinoid stopped 7 days prior to peel: Yes  Peel Type: Vitalize peel skin medica  Has patient had electrolysis, depilatory creams, waxing or laser hair removal in the last week to treatment site: No.  If yes, then hold peel.    Peel record:  Eye shields were placed.  Area to be treated was cleansed with Simply Clean Cleanser using 4X4 gauze pads.  Dermaplaning was performed: no.  Area was then toned with with the Conditioning Solution using 4X4 gauze pads.  Then, the areas were degreased with acetone. Time-out was performed to evaluate for any sensitive skin.    Hydrabalm was then applied to the lips, perinasal region and near the outer canthi.   The peel was applied with taylor swabs for 3 passes, then removed with water dampened 4X4 soft gauze.  CE Ferulic, Phytocorrective gel and epidermal repair topicals were applied and followed by Sunscreen (Sheer Physical UV defence SPF 50).     Additional treatment notes:n/a    Post peel:  Patient reminded of need to wait to use medicated creams until the skin has healed. This occurs five to seven days later. Post peel care instructions provided including need for sun avoidance. Patient tolerated peel well, no complications noted.     Payment:  The patient for a package for this peel. This was 1/3    Sneha Zuleta RN on 2/1/2024 at 3:12 PM    Staff Physician Comments:   The RN saw and evaluated the patient (Hemanth Zuleta RN).  I agree with the assessment  and plan and description of the procedure as above.   I was immediately available at all times.       Matt Gomez DO    Department of Dermatology  [facility]: Phone: [phone], Fax:[phone]  [facility]: Phone: [phone], Fax: [phone]

## 2024-04-22 NOTE — PROGRESS NOTES
[facility] Dermatology Note  Encounter Date: Apr 25, 2024  Office Visit     Dermatology Problem List:  0. NUB, mid back, s/p bx 4/25/24  0. NUB, L mid back, s/p bx 04/25/24  1. Solar lentigines, seborrheic keratoses on face  2. Banal-appearing melanocytic nevi on the trunk  3. Melasma  - Cyspera started 1/5/22  - hydroquinone 8% started 6/18/21  - hydroquinone 4% cream - past tx  - Azelaic acid  - Chemical peels should be considered if not improved  4. Rhytides/volume loss  - Botox  - peels  - Filler, Restalyne Lyft to cheeks 09/16/22, Restalyne Lyft and Contour to cheeks 8/4/23  5. SKs  - central mid back, cryo 04/25/24  ____________________________________________    Assessment & Plan:    # Melasma. Has been doing peels  -hydroquinone 8% compound for 12 weeks    1 cm plaque    # Nevus  on the mid back. The differential diagnosis includes Nevus. .    - 1 cm plaque  - Shave removal today. See procedure section.      # NEvus on the L mid back. The differential diagnosis includes Nevus. .    - 1 cm plaque  - Shave removal today. See procedure section.      # Seborrheic keratosis, symptomatic, central mid back.  - Cryotherapy today. See procedure section.      Procedures Performed:   - Shave removal, location(s): mid back, L mid back.  After discussion of benefits and risks including but not limited to bleeding/bruising, pain/swelling, infection, scar, incomplete removal, nerve damage/numbness, recurrence, and non-diagnostic biopsy, written consent, verbal consent and photographs were obtained. Time-out was performed. The area was cleaned with isopropyl alcohol. 0.5mL of 1% lidocaine with epinephrine was injected to obtain adequate anesthesia of each lesion. Shave removal was performed. Hemostasis was achieved with aluminium chloride. Vaseline and a sterile dressing were applied. The patient tolerated the procedure and no complications were noted. The patient was provided with verbal and written  post care instructions.      - Cryotherapy procedure note, location(s): central mid back. After verbal consent and discussion of risks and benefits including, but not limited to, dyspigmentation/scar, blister, and pain, 1 lesion(s) was(were) treated with 1-2 mm freeze border for 1-2 cycles with liquid nitrogen. Post cryotherapy instructions were provided.    Follow-up: for 1 syrgine of lyft, 1 syringe of contour and 35 units of botox for cosmetic visit.     Staff and Scribe:     Scribe Disclosure:   I, Abbi Lei, am serving as a scribe to document services personally performed by Gilda Hernandez MD based on data collection and the provider's statements to me.       Provider Disclosure:   The documentation recorded by the scribe accurately reflects the services I personally performed and the decisions made by me.    Gilda Hernandez MD    Department of Dermatology  [facility]: Phone: [phone], Fax:[phone]  [facility]: Phone: [phone], Fax: [phone]   ____________________________________________    CC: spot check (Patient is here today for two spots on back that are itching.  Patient would like removal. No HX of skin cancer.  Family history of grandmother with unknown skin cancer. )    HPI:  Ms. Marium Moss is a(n) 43 year old female who presents today as a return patient for spot check    Today, patient reports two spots on the back she would like removed today. They get itchy and sometimes sore. She has a third spot on her back that is also very itchy.       Patient is otherwise feeling well, without additional skin concerns.    Labs Reviewed:  N/A    Physical Exam:  Vitals: There were no vitals taken for this visit.  SKIN: Focused examination of face and back was performed.  - There is a 1 cm plaque on the mid back..   - There is a 1 cm plaque on the L mid back..   - There is a  waxy stuck on tan to brown papule on the central mid back..   - No other lesions of concern on areas examined.     Medications:  Current Outpatient Medications   Medication Sig Dispense Refill    lamoTRIgine (LAMICTAL) 150 MG tablet Take 1 tablet (150 mg) by mouth daily      CLONAZEPAM PO  (Patient not taking: Reported on 3/31/2023)      COMPOUNDED NON-CONTROLLED SUBSTANCE (CMPD RX) - PHARMACY TO MIX COMPOUNDED MEDICATION Apply once daily for up to 12 weeks (Patient not taking: Reported on 1/27/2023) 28 g 2    FLUoxetine (PROZAC) 20 MG capsule Take 20 mg by mouth daily (Patient not taking: Reported on 8/4/2023)      hydrocortisone 2.5 % cream Apply twice daily on the face for 1 weeks on cheeks (Patient not taking: Reported on 1/27/2023) 30 g 0     Current Facility-Administered Medications   Medication Dose Route Frequency Provider Last Rate Last Admin    botulinum toxin type A (BOTOX) 100 units injection 40 Units  40 Units Intramuscular Once Gilda Hernandez MD        Botulinum Toxin Type A (Cosm) (BOTOX-COSMETIC) 50 units injection 26 Units  26 Units Intramuscular Once Gilda Hernandez MD          Past Medical History:   Patient Active Problem List   Diagnosis    CARDIOVASCULAR SCREENING; LDL GOAL LESS THAN 160    Bipolar disorder (manic depression) (H)    Bipolar I disorder, most recent episode depressed, severe without psychotic features (H)    Chronic headaches    Constipation    Family planning education, guidance, and counseling    External hemorrhoid, bleeding    Hemorrhoids    Pap smear of cervix with ASCUS, cannot exclude HGSIL    S/P hemorrhoidectomy    Tremor of left hand     Past Medical History:   Diagnosis Date    Bipolar affective disorder (H)     Depressive disorder, not elsewhere classified     sees therapist - Mary Ceja for med mgmt    External hemorrhoid, bleeding 7/20/2020    Skin disease         CC Referred Self, MD  No address on file on close of this encounter.     Burow's Graft Text: The defect edges were debeveled with a #15 scalpel blade. Given the location of the defect, shape of the defect, the proximity to free margins and the presence of a standing cone deformity a Burow's skin graft was deemed most appropriate. The standing cone was removed and this tissue was then trimmed to the shape of the primary defect. The adipose tissue was also removed until only dermis and epidermis were left.  The skin margins of the secondary defect were undermined to an appropriate distance in all directions utilizing iris scissors.  The secondary defect was closed with interrupted buried subcutaneous sutures.  The skin edges were then re-apposed with running  sutures.  The skin graft was then placed in the primary defect and oriented appropriately.

## 2024-04-25 ENCOUNTER — OFFICE VISIT (OUTPATIENT)
Dept: DERMATOLOGY | Facility: CLINIC | Age: 44
End: 2024-04-25
Payer: COMMERCIAL

## 2024-04-25 ENCOUNTER — TELEPHONE (OUTPATIENT)
Dept: DERMATOLOGY | Facility: CLINIC | Age: 44
End: 2024-04-25

## 2024-04-25 DIAGNOSIS — D48.5 NEOPLASM OF UNCERTAIN BEHAVIOR OF SKIN: Primary | ICD-10-CM

## 2024-04-25 DIAGNOSIS — L81.1 MELASMA: ICD-10-CM

## 2024-04-25 DIAGNOSIS — L82.1 SEBORRHEIC KERATOSIS: ICD-10-CM

## 2024-04-25 PROCEDURE — 11301 SHAVE SKIN LESION 0.6-1.0 CM: CPT | Performed by: DERMATOLOGY

## 2024-04-25 PROCEDURE — 99214 OFFICE O/P EST MOD 30 MIN: CPT | Mod: 25 | Performed by: DERMATOLOGY

## 2024-04-25 PROCEDURE — 88305 TISSUE EXAM BY PATHOLOGIST: CPT | Performed by: DERMATOLOGY

## 2024-04-25 PROCEDURE — 17110 DESTRUCTION B9 LES UP TO 14: CPT | Mod: XS | Performed by: DERMATOLOGY

## 2024-04-25 ASSESSMENT — PAIN SCALES - GENERAL: PAINLEVEL: NO PAIN (0)

## 2024-04-25 NOTE — LETTER
4/25/2024         RE: Marium Moss  [address] [address]        Dear Colleague,    Thank you for referring your patient, Marium Moss, to the [facility]. Please see a copy of my visit note below.      [facility] Dermatology Note  Encounter Date: Apr 25, 2024  Office Visit     Dermatology Problem List:  0. NUB, mid back, s/p bx 4/25/24  0. NUB, L mid back, s/p bx 04/25/24  1. Solar lentigines, seborrheic keratoses on face  2. Banal-appearing melanocytic nevi on the trunk  3. Melasma  - Cyspera started 1/5/22  - hydroquinone 8% started 6/18/21  - hydroquinone 4% cream - past tx  - Azelaic acid  - Chemical peels should be considered if not improved  4. Rhytides/volume loss  - Botox  - peels  - Filler, Restalyne Lyft to cheeks 09/16/22, Restalyne Lyft and Contour to cheeks 8/4/23  5. SKs  - central mid back, cryo 04/25/24  ____________________________________________    Assessment & Plan:    # Melasma. Has been doing peels  -hydroquinone 8% compound for 12 weeks    1 cm plaque    # Nevus  on the mid back. The differential diagnosis includes Nevus. .    - 1 cm plaque  - Shave removal today. See procedure section.      # NEvus on the L mid back. The differential diagnosis includes Nevus. .    - 1 cm plaque  - Shave removal today. See procedure section.      # Seborrheic keratosis, symptomatic, central mid back.  - Cryotherapy today. See procedure section.      Procedures Performed:   - Shave removal, location(s): mid back, L mid back.  After discussion of benefits and risks including but not limited to bleeding/bruising, pain/swelling, infection, scar, incomplete removal, nerve damage/numbness, recurrence, and non-diagnostic biopsy, written consent, verbal consent and photographs were obtained. Time-out was performed. The area was cleaned with isopropyl alcohol. 0.5mL of 1% lidocaine with epinephrine was injected to obtain adequate anesthesia of each  lesion. Shave removal was performed. Hemostasis was achieved with aluminium chloride. Vaseline and a sterile dressing were applied. The patient tolerated the procedure and no complications were noted. The patient was provided with verbal and written post care instructions.      - Cryotherapy procedure note, location(s): central mid back. After verbal consent and discussion of risks and benefits including, but not limited to, dyspigmentation/scar, blister, and pain, 1 lesion(s) was(were) treated with 1-2 mm freeze border for 1-2 cycles with liquid nitrogen. Post cryotherapy instructions were provided.    Follow-up: for 1 syrgine of lyft, 1 syringe of contour and 35 units of botox for cosmetic visit.     Staff and Scribe:     Scribe Disclosure:   I, Abbi Lei, am serving as a scribe to document services personally performed by Gilda Hernandez MD based on data collection and the provider's statements to me.       Provider Disclosure:   The documentation recorded by the scribe accurately reflects the services I personally performed and the decisions made by me.    Gilda Hernandez MD    Department of Dermatology  [facility]: Phone: [phone], Fax:[phone]  [facility]: Phone: [phone], Fax: [phone]   ____________________________________________    CC: spot check (Patient is here today for two spots on back that are itching.  Patient would like removal. No HX of skin cancer.  Family history of grandmother with unknown skin cancer. )    HPI:  Ms. Marium Moss is a(n) 43 year old female who presents today as a return patient for spot check    Today, patient reports two spots on the back she would like removed today. They get itchy and sometimes sore. She has a third spot on her back that is also very itchy.       Patient is otherwise feeling well, without additional skin concerns.    Labs  Reviewed:  N/A    Physical Exam:  Vitals: There were no vitals taken for this visit.  SKIN: Focused examination of face and back was performed.  - There is a 1 cm plaque on the mid back..   - There is a 1 cm plaque on the L mid back..   - There is a waxy stuck on tan to brown papule on the central mid back..   - No other lesions of concern on areas examined.     Medications:  Current Outpatient Medications   Medication Sig Dispense Refill     lamoTRIgine (LAMICTAL) 150 MG tablet Take 1 tablet (150 mg) by mouth daily       CLONAZEPAM PO  (Patient not taking: Reported on 3/31/2023)       COMPOUNDED NON-CONTROLLED SUBSTANCE (CMPD RX) - PHARMACY TO MIX COMPOUNDED MEDICATION Apply once daily for up to 12 weeks (Patient not taking: Reported on 1/27/2023) 28 g 2     FLUoxetine (PROZAC) 20 MG capsule Take 20 mg by mouth daily (Patient not taking: Reported on 8/4/2023)       hydrocortisone 2.5 % cream Apply twice daily on the face for 1 weeks on cheeks (Patient not taking: Reported on 1/27/2023) 30 g 0     Current Facility-Administered Medications   Medication Dose Route Frequency Provider Last Rate Last Admin     botulinum toxin type A (BOTOX) 100 units injection 40 Units  40 Units Intramuscular Once Gilda Hernandez MD         Botulinum Toxin Type A (Cosm) (BOTOX-COSMETIC) 50 units injection 26 Units  26 Units Intramuscular Once Gilda Hernandez MD          Past Medical History:   Patient Active Problem List   Diagnosis     CARDIOVASCULAR SCREENING; LDL GOAL LESS THAN 160     Bipolar disorder (manic depression) (H)     Bipolar I disorder, most recent episode depressed, severe without psychotic features (H)     Chronic headaches     Constipation     Family planning education, guidance, and counseling     External hemorrhoid, bleeding     Hemorrhoids     Pap smear of cervix with ASCUS, cannot exclude HGSIL     S/P hemorrhoidectomy     Tremor of left hand     Past Medical History:   Diagnosis Date     Bipolar affective disorder (H)       Depressive disorder, not elsewhere classified     sees therapist - Mary Ceja for med mgmt     External hemorrhoid, bleeding 7/20/2020     Skin disease         CC Referred Self, MD  No address on file on close of this encounter.      Again, thank you for allowing me to participate in the care of your patient.        Sincerely,        Gilda Hernandez MD

## 2024-04-25 NOTE — NURSING NOTE
Marium Moss's goals for this visit include:   Chief Complaint   Patient presents with    spot check     Patient is here today for two spots on back that are itching.  Patient would like removal. No HX of skin cancer.  Family history of grandmother with unknown skin cancer.       She requests these members of her care team be copied on today's visit information:     PCP: Clinic, [facility] [place]    Referring Provider:  Referred Self, MD  No address on file    There were no vitals taken for this visit.    Do you need any medication refills at today's visit?     Sandi Mcneill on 4/25/2024 at 10:19 AM

## 2024-04-25 NOTE — TELEPHONE ENCOUNTER
[facility] Call Center    Phone Message    May a detailed message be left on voicemail: yes     Reason for Call: Medication Question or concern regarding medication   Prescription Clarification  Name of Medication: COMPOUNDED NON-CONTROLLED SUBSTANCE (CMPD RX) - PHARMACY TO MIX COMPOUNDED MEDICATION  Prescribing Provider: David   Pharmacy:   [facility] - [address]      What on the order needs clarification? No info on what is to be compounded      Action Taken: Message routed to:  Adult Clinics: Dermatology p [id number]    Travel Screening: Not Applicable

## 2024-04-25 NOTE — PATIENT INSTRUCTIONS
Wound Care After a Biopsy    What is a skin biopsy?  A skin biopsy allows the doctor to examine a very small piece of tissue under the microscope to determine the diagnosis and the best treatment for the skin condition. A local anesthetic (numbing medicine) is injected with a very small needle into the skin area to be tested. A small piece of skin is taken from the area. Sometimes a suture (stitch) is used.     What are the risks of a skin biopsy?  I will experience scar, bleeding, swelling, pain, crusting and redness. I may experience incomplete removal or recurrence. Risks of this procedure are excessive bleeding, bruising, infection, nerve damage, numbness, thick (hypertrophic or keloidal) scar and non-diagnostic biopsy.    How should I care for my wound for the first 24 hours?  Keep the wound dry and covered for 24 hours  If it bleeds, hold direct pressure on the area for 15 minutes. If bleeding does not stop, call us or go to the emergency room  Avoid strenuous exercise the first 1-2 days or as your doctor instructs you    How should I care for the wound after 24 hours?  After 24 hours, remove the bandage  You may bathe or shower as normal  If you had a scalp biopsy, you can shampoo as usual and can use shower water to clean the biopsy site daily  Clean the wound once a day with gentle soap and water  Do not scrub, be gentle  Apply white petroleum/Vaseline after cleaning the wound with a cotton swab or a clean finger, and keep the site covered with a Bandaid /bandage. Bandages are not necessary with a scalp biopsy  If you are unable to cover the site with a Bandaid /bandage, re-apply ointment 2-3 times a day to keep the site moist. Moisture will help with healing  Avoid strenuous activity for first 1-2 days  Avoid lakes, rivers, pools, and oceans until the stitches are removed or the site is healed    How do I clean my wound?  Wash hands thoroughly with soap or use hand  before all wound care  Clean  the wound with gentle soap and water  Apply white petroleum/Vaseline  to wound after it is clean  Replace the Bandaid /bandage to keep the wound covered for the first few days or as instructed by your doctor  If you had a scalp biopsy, warm shower water to the area on a daily basis should suffice    What should I use to clean my wound?   Cotton-tipped applicators (Qtips )  White petroleum jelly (Vaseline ). Use a clean new container and use Q-tips to apply.  Bandaids  as needed  Gentle soap     How should I care for my wound long term?  Do not get your wound dirty  Keep up with wound care for one week or until the area is healed.  Cryotherapy    What is it?  Use of a very cold liquid, such as liquid nitrogen, to freeze and destroy abnormal skin cells that need to be removed    What should I expect?  Tenderness and redness  A small blister that might grow and fill with dark purple blood. There may be crusting.  More than one treatment may be needed if the lesions do not go away.    How do I care for the treated area?  Gently wash the area with your hands when bathing.  Use a thin layer of Vaseline to help with healing. You may use a Band-Aid.   The area should heal within 7-10 days and may leave behind a pink or lighter color.   Do not use an antibiotic or Neosporin ointment.   You may take acetaminophen (Tylenol) for pain.     Call your doctor if you have:  Severe pain  Signs of infection (warmth, redness, cloudy yellow drainage, and or a bad smell)  Questions or concerns    Who should I call with questions?      [facility]: [phone]      [facility]: [phone]      For urgent needs outside of business hours call the [facility] at [phone] and ask for the dermatology resident on call A small scab will form and fall off by itself when the area is completely healed. The area will be red and will become pink in color as it heals. Sun  protection is very important for how your scar will turn out. Sunscreen with an SPF 30 or greater is recommended once the area is healed.  You should have some soreness but it should be mild and slowly go away over several days. Talk to your doctor about using tylenol for pain,    When should I call my doctor?  If you have increased:   Pain or swelling  Pus or drainage (clear or slightly yellow drainage is ok)  Temperature over 100F  Spreading redness or warmth around wound    When will I hear about my results?  The biopsy results can take 2 weeks to come back.  Your results will automatically release to [facility] before your provider has even reviewed them.  The clinic will call you with the results, send you a [facility] message, or have you schedule a follow-up clinic or phone time to discuss the results.  Contact our clinics if you do not hear from us in 2 weeks.    Who should I call with questions?  [facility]: [phone]  [facility]: [phone]  For urgent needs outside of business hours call the [facility] at [phone] and ask for the dermatology resident on call

## 2024-04-25 NOTE — NURSING NOTE
The following medication was given:     MEDICATION:  Lidocaine with epinephrine 1% 1:[id number]  ROUTE: SQ  SITE: see procedure note  DOSE: 2ml  LOT #: [id number]  : [employer]  EXPIRATION DATE: 01/31/2025  NDC#: 05657-682-06  Was there drug waste? no  Multi-dose vial: Yes    Sandi Mcneill  April 25, 2024

## 2024-04-29 LAB
PATH REPORT.COMMENTS IMP SPEC: NORMAL
PATH REPORT.COMMENTS IMP SPEC: NORMAL
PATH REPORT.FINAL DX SPEC: NORMAL
PATH REPORT.GROSS SPEC: NORMAL
PATH REPORT.MICROSCOPIC SPEC OTHER STN: NORMAL
PATH REPORT.RELEVANT HX SPEC: NORMAL

## 2024-05-01 ENCOUNTER — TELEPHONE (OUTPATIENT)
Dept: DERMATOLOGY | Facility: CLINIC | Age: 44
End: 2024-05-01
Payer: COMMERCIAL

## 2024-05-01 NOTE — TELEPHONE ENCOUNTER
Pt read [facility] message and will call the clinic with any questions or concerns.   Sneha Zuleta RN on 5/1/2024 at 8:34 AM      Final Diagnosis  A. Left mid back:  - Intradermal melanocytic nevus - (see description)     B, Mid back:  - Intradermal melanocytic nevus - (see description)  Electronically signed by Misbah Maier MD on 4/29/2024 at  2:45 PM         2 normal moles  Written by Gilda Hernandez MD on 4/30/2024  6:37 PM CDT  Seen by patient Marium Moss on 4/30/2024  6:57 PM

## 2024-06-06 NOTE — PATIENT INSTRUCTIONS
Botulinum Toxin (Botox/Dysport) Cosmetic Information    Risks: I will have pain, redness, and swelling. I may have bruising, headache or discomfort at the site(s). Risks are asymmetry, numbness, twitching, brow droop, eyelid droop, headache, double vision, not enough effect or too much effect, difficulty whistling or drinking, loss of muscle tone, headache or infection. A touch-up or multiple treatments may be required.    About Botulinum Toxin (Botox/Dysport)  You have inquired about Botox cosmetic. Botulinum toxin is a purified protein derivative developed from bacteria. It has the ability to immobilize facial muscles that create dynamic wrinkles. Dynamic wrinkles develop due to muscle contraction, and over time become permanent folds in the skin, even when the muscles are not flexed. The use of Botox results in a very pleasing cosmetic effect for many people, leading to a more youthful, relaxed appearance. Botox can be used in combination with injectable fillers, chemical peels, and laser resurfacing to treat deeper wrinkles. It also has become an accepted form of treatment for hyperhidrosis, (or excessive sweating) in people who have not responded to other therapies.     With my treatment side effects may include bruising, headache or discomfort at the site(s). Asymmetry may occur and a touch-up may be required. Risks of this procedure include numbness, muscle twitching, brow or eyelid droop, headache, double vision, not enough effect or too much effect, difficulty whistling or drinking from a straw, loss of muscle tone, headache or infection.    Post-Procedure Instructions:   Do not rub the treated area. Avoid exercise for the 24 hours following the procedure. You may move the muscles. Some people will experience bruising or eyelid ptosis (drooping) after injection. This is temporary and usually mild. Eyelid ptosis may be treated with special eye drops. Call your doctor if you have any questions or concerns  after your treatment.     Do not massage the area for 24 hours  Stay upright for 4 hours  Do not push on the injected area  Contact us if you have asymmetry at 2 weeks  Do not apply skin products to the area for 24 hours    Who should I call with questions?  [facility]: [phone]  [facility]: [phone]  For urgent needs outside of business hours call the [facility] at [phone] and ask for the resident on call  MyChart messaging may be delayed   Filler Information:    Risks of the procedure:I will have pain, bruising, redness, and swelling after the procedure and lasting for approximately 1-3 weeks. Risks are lumps/bumps, skin discoloration, bleeding, numbness, infection, granuloma formation, scar, ulceration, under correction, over correction and rarely, risks of stroke and blindness. Multiple treatments may be required.      What are  fillers?    Fillers are injected into the skin to soften crease or folds, support areas of volume loss or contour specific facial areas.  You may experience a mild to moderate amount of stinging or aching sensation post injection.  To ensure an even correction, the physician will massage the area treated, which may cause a temporary amount of redness to your skin.  Bruising at the site of injection is common and may last two weeks  Temporary minimal to moderate swelling can be expected, which should gradually improve following injection  It is normal to experience some tenderness at the treatment site for a few days    After treatment care instructions:  Apply an ice pack or cold compress to the injection area after treatment to help reduce swelling.  Keep your head elevated (even while sleeping) as much as possible and avoid sleeping on your side or stomach  No alcohol consumption or exercise for the first 24 hours after treatment.  Do not touch the treated area for 6 hours  You may use make-up, creams  and sunscreens after 24 hours  You may return to normal/routine activities but you should check with your physician for their recommendation  Avoid excessive scrubbing or rubbing of the injection area  If you have previously suffered from cold sores, there is a risk that the needle punctures around the mouth/lips could contribute to another recurrence  Immediately report to your physician if you have any of the following:  Delayed swelling happening 7-14 days after treatment  Numbness lasting 3-4 days  Muscle weakness in the area of injection  Severe pain  Dusky discoloration of one part of the face  Bruising  Changes in vision or eye pain  Cold sore  Avoid COVID vaccination 2 weeks after filler and dental procedures 4 weeks after filler    Who should I call with questions?  [facility]: [phone]   [facility]: [phone]  For urgent needs outside of business hours call the [facility] at [phone] and ask for the resident on call  MyChart messages may be delayed, call for urgent issues

## 2024-06-06 NOTE — PROGRESS NOTES
Botulinum Injection Procedure Note:  Cosmetic      Dermatology Problem List:  0. NUB, mid back, s/p bx 4/25/24  0. NUB, L mid back, s/p bx 04/25/24  1. Solar lentigines, seborrheic keratoses on face  2. Banal-appearing melanocytic nevi on the trunk  3. Melasma  - Cyspera started 1/5/22  - hydroquinone 8% started 6/18/21  - hydroquinone 4% cream - past tx  - Azelaic acid  - Chemical peels should be considered if not improved  -adding skin discoloration defense 6/7/2024  4. Rhytides/volume loss  - Botox  - peels  - Filler, Restalyne Lyft to cheeks 09/16/22, Restalyne Lyft and Contour to cheeks 8/4/23 and repeat 6/7/2024  5. SKs  - central mid back, cryo 04/25/24      SURGEON (S): Dr. Gilda Hernandez     NURSE:  Yoanna Kat EMT present.      ANESTHESIA:   None    PREOPERATIVE DIAGNOSIS:   Rhytides    LOCATION: lateral forehead, glabella        OPERATION/PROCEDURE:   Intralesional botulinum toxin injection     Dilution with 0.6 preserved sterile normal saline in a 50 Unit Botox Vial.    Total units of botulinum toxin: 40     POSTOPERATIVE DIAGNOSIS:   SAME     PREPARATION:   Alcohol swab    DESCRIPTION OF OPERATION/PROCEDURE:   The nature and purpose of the procedure, associated risks including but not limited to bruising, headache or discomfort at the site(s), numbness, muscle twitching, brow or eyelid droop, headache, double vision, not enough effect or too much effect, difficulty whistling or drinking from a straw, loss of muscle tone, or infection. Possible consequences and complications, and alternative methods of treatment were explained in detail. The patient declined a personal or family history of neuromuscular disease prior to the procedure. The patient is not pregnant or breast feeding.    A signed informed operative consent was obtained.    The patient was taken to the operative suite and properly positioned. The area to be treated was defined and confirmed by the patient and physician. The area for Botox  injection was marked.    Cosmetic procedure: Injections were performed. The patient tolerated the procedure well and there were no complications noted.         Clinical Follow-Up: 2-3 weeks by phone if needed and otherwise 3-6 months for repeat botox      Staff Involved:  Scribe/Staff  Scribe Disclosure:   I, Abbi Lei, am serving as a scribe to document services personally performed by Gilda Hernandez MD based on data collection and the provider's statements to me.     Provider Disclosure:   The documentation recorded by the scribe accurately reflects the services I personally performed and the decisions made by me.    Gilda Hernandez MD    Department of Dermatology  [facility]: Phone: [phone], Fax:[phone]  [facility] [place]: Phone: [phone], Fax: [phone]     Soft Tissue Augmentation Procedure Note: Cosmetic      Dermatology Problem List:  See above    Procedure Date: 6/7/2024    Attending Staff: Gilda Hernandez MD    Resident: n/a         Diagnosis:   facical volume depletion    Location: cheeks     Product: Restalyne Contour  Amount: 1 cc  Lot #: [id number]  Exp Date: 3/31/25        Description of Operation/Procedure:   The nature and purpose of the procedure, associated risks, possible consequences and complications, and alternative methods of treatment were explained in detail including but not limited to bruising, pain, redness,lumps/bumps, granuloma formation, scar blindness, stroke, ulceration, ischemia, under correction, over correction, swelling, possible need for multiple treatments, infection, granuloma, pain, dyspigmentation, numbness, weakness or tingling were explained to the patient. We discussed that multiple treatments may be required.  Discussion of FDA on-label and off-label use was completed and disclosure for any sites treated off-label versus on-label was provided to  patient. The patient verbalized understanding. Photo consent and signed informed consent were obtained. Time-out was performed and patient denied history of severe allergy to bees.  Denies recent upcoming dental procedures or covid vaccine in the last 2 weeks.      The facial areas were cleansed with chlorhexidine and injections were performed.  The patient tolerated the procedure well and there were no complications. Ice was provided post-procedure. The patient was provided after care instructions and will follow-up in 1 week.     The patient will pay cosmetic fee today.        Staff:  Scribe/Staff  Scribe Disclosure:   I, Abbi Lei, am serving as a scribe to document services personally performed by Gilda Hernandez MD based on data collection and the provider's statements to me.     Provider Disclosure:   The documentation recorded by the scribe accurately reflects the services I personally performed and the decisions made by me.    Gilda Hernandez MD    Department of Dermatology  [facility]: Phone: [phone], Fax:[phone]  [facility]: Phone: [phone], Fax: [phone]

## 2024-06-07 ENCOUNTER — OFFICE VISIT (OUTPATIENT)
Dept: DERMATOLOGY | Facility: CLINIC | Age: 44
End: 2024-06-07

## 2024-06-07 DIAGNOSIS — L98.8 RHYTIDES: Primary | ICD-10-CM

## 2024-06-07 PROCEDURE — 96999 UNLISTED SPEC DERM SVC/PX: CPT | Performed by: DERMATOLOGY

## 2024-06-07 NOTE — LETTER
6/7/2024      Marium Moss  [address] [address]      Dear Colleague,    Thank you for referring your patient, Marium Moss, to the [facility]. Please see a copy of my visit note below.      Botulinum Injection Procedure Note:  Cosmetic      Dermatology Problem List:  0. NUB, mid back, s/p bx 4/25/24  0. NUB, L mid back, s/p bx 04/25/24  1. Solar lentigines, seborrheic keratoses on face  2. Banal-appearing melanocytic nevi on the trunk  3. Melasma  - Cyspera started 1/5/22  - hydroquinone 8% started 6/18/21  - hydroquinone 4% cream - past tx  - Azelaic acid  - Chemical peels should be considered if not improved  -adding skin discoloration defense 6/7/2024  4. Rhytides/volume loss  - Botox  - peels  - Filler, Restalyne Lyft to cheeks 09/16/22, Restalyne Lyft and Contour to cheeks 8/4/23 and repeat 6/7/2024  5. SKs  - central mid back, cryo 04/25/24      SURGEON (S): Dr. Gilda Hernandez     NURSE:  Yoanna Kat EMT present.      ANESTHESIA:   None    PREOPERATIVE DIAGNOSIS:   Rhytides    LOCATION: lateral forehead, glabella        OPERATION/PROCEDURE:   Intralesional botulinum toxin injection     Dilution with 0.6 preserved sterile normal saline in a 50 Unit Botox Vial.    Total units of botulinum toxin: 40     POSTOPERATIVE DIAGNOSIS:   SAME     PREPARATION:   Alcohol swab    DESCRIPTION OF OPERATION/PROCEDURE:   The nature and purpose of the procedure, associated risks including but not limited to bruising, headache or discomfort at the site(s), numbness, muscle twitching, brow or eyelid droop, headache, double vision, not enough effect or too much effect, difficulty whistling or drinking from a straw, loss of muscle tone, or infection. Possible consequences and complications, and alternative methods of treatment were explained in detail. The patient declined a personal or family history of neuromuscular disease prior to the procedure. The patient is not pregnant or breast  feeding.    A signed informed operative consent was obtained.    The patient was taken to the operative suite and properly positioned. The area to be treated was defined and confirmed by the patient and physician. The area for Botox injection was marked.    Cosmetic procedure: Injections were performed. The patient tolerated the procedure well and there were no complications noted.         Clinical Follow-Up: 2-3 weeks by phone if needed and otherwise 3-6 months for repeat botox      Staff Involved:  Scribe/Staff  Scribe Disclosure:   I, Abbi Lei, am serving as a scribe to document services personally performed by Gilda Hernandez MD based on data collection and the provider's statements to me.     Provider Disclosure:   The documentation recorded by the scribe accurately reflects the services I personally performed and the decisions made by me.    Gilda Hernandez MD    Department of Dermatology  [facility]: Phone: [phone], Fax:[phone]  [facility]: Phone: [phone], Fax: [phone]     Soft Tissue Augmentation Procedure Note: Cosmetic      Dermatology Problem List:  See above    Procedure Date: 6/7/2024    Attending Staff: Gilda Hernandez MD    Resident: n/a         Diagnosis:   facical volume depletion    Location: cheeks     Product: Restalyne Contour  Amount: 1 cc  Lot #: [id number]  Exp Date: 3/31/25        Description of Operation/Procedure:   The nature and purpose of the procedure, associated risks, possible consequences and complications, and alternative methods of treatment were explained in detail including but not limited to bruising, pain, redness,lumps/bumps, granuloma formation, scar blindness, stroke, ulceration, ischemia, under correction, over correction, swelling, possible need for multiple treatments, infection, granuloma, pain, dyspigmentation, numbness, weakness or  tingling were explained to the patient. We discussed that multiple treatments may be required.  Discussion of FDA on-label and off-label use was completed and disclosure for any sites treated off-label versus on-label was provided to patient. The patient verbalized understanding. Photo consent and signed informed consent were obtained. Time-out was performed and patient denied history of severe allergy to bees.  Denies recent upcoming dental procedures or covid vaccine in the last 2 weeks.      The facial areas were cleansed with chlorhexidine and injections were performed.  The patient tolerated the procedure well and there were no complications. Ice was provided post-procedure. The patient was provided after care instructions and will follow-up in 1 week.     The patient will pay cosmetic fee today.        Staff:  Scribe/Staff  Scribe Disclosure:   I, Abbi Lei, am serving as a scribe to document services personally performed by Gilda Hernandez MD based on data collection and the provider's statements to me.     Provider Disclosure:   The documentation recorded by the scribe accurately reflects the services I personally performed and the decisions made by me.    Gilda Hernandez MD    Department of Dermatology  [facility]: Phone: [phone], Fax:[phone]  [facility]: Phone: [phone], Fax: [phone]             Again, thank you for allowing me to participate in the care of your patient.        Sincerely,        Gilda Hernandez MD

## 2024-06-07 NOTE — NURSING NOTE
Marium Moss's goals for this visit include:   Chief Complaint   Patient presents with    Botox     Patient is here for botox and filler       She requests these members of her care team be copied on today's visit information:     PCP: Samy, [facility] [place]    Referring Provider:  Referred Self, MD  No address on file    There were no vitals taken for this visit.    Do you need any medication refills at today's visit?         Yoanna Kat EMT

## 2024-10-29 ENCOUNTER — TELEPHONE (OUTPATIENT)
Dept: DERMATOLOGY | Facility: CLINIC | Age: 44
End: 2024-10-29
Payer: COMMERCIAL

## 2024-10-29 NOTE — TELEPHONE ENCOUNTER
Pt contacted multiple times, letter has been sent to reschedule [facility] appointment. Appt. Has been cancelled.     Phyllis Conti LPN on 10/29/2024 at 1:04 PM

## 2025-03-08 ENCOUNTER — HEALTH MAINTENANCE LETTER (OUTPATIENT)
Age: 45
End: 2025-03-08

## 2025-06-22 ENCOUNTER — HEALTH MAINTENANCE LETTER (OUTPATIENT)
Age: 45
End: 2025-06-22